# Patient Record
Sex: FEMALE | Race: OTHER | HISPANIC OR LATINO | Employment: FULL TIME | URBAN - METROPOLITAN AREA
[De-identification: names, ages, dates, MRNs, and addresses within clinical notes are randomized per-mention and may not be internally consistent; named-entity substitution may affect disease eponyms.]

---

## 2023-09-27 DIAGNOSIS — E78.5 HYPERLIPIDEMIA, UNSPECIFIED: ICD-10-CM

## 2023-09-27 DIAGNOSIS — E03.9 HYPOTHYROIDISM, UNSPECIFIED: ICD-10-CM

## 2023-09-27 RX ORDER — ROSUVASTATIN CALCIUM 5 MG/1
5 TABLET, COATED ORAL DAILY
Qty: 90 TABLET | Refills: 0 | Status: SHIPPED | OUTPATIENT
Start: 2023-09-27 | End: 2023-10-19 | Stop reason: ALTCHOICE

## 2023-09-27 RX ORDER — LEVOTHYROXINE SODIUM 25 UG/1
25 TABLET ORAL DAILY
Qty: 90 TABLET | Refills: 0 | Status: SHIPPED | OUTPATIENT
Start: 2023-09-27 | End: 2023-10-19 | Stop reason: ALTCHOICE

## 2023-10-19 ENCOUNTER — TELEMEDICINE (OUTPATIENT)
Dept: PRIMARY CARE | Facility: CLINIC | Age: 58
End: 2023-10-19
Payer: COMMERCIAL

## 2023-10-19 DIAGNOSIS — R05.1 ACUTE COUGH: Primary | ICD-10-CM

## 2023-10-19 DIAGNOSIS — R09.81 SINUS CONGESTION: ICD-10-CM

## 2023-10-19 PROCEDURE — 99214 OFFICE O/P EST MOD 30 MIN: CPT | Performed by: FAMILY MEDICINE

## 2023-10-19 RX ORDER — AMOXICILLIN AND CLAVULANATE POTASSIUM 875; 125 MG/1; MG/1
875 TABLET, FILM COATED ORAL 2 TIMES DAILY
Qty: 20 TABLET | Refills: 0 | Status: SHIPPED | OUTPATIENT
Start: 2023-10-19 | End: 2023-10-29

## 2023-10-19 RX ORDER — VALACYCLOVIR HYDROCHLORIDE 1 G/1
TABLET, FILM COATED ORAL
COMMUNITY
Start: 2015-10-27

## 2023-10-19 RX ORDER — NAPROXEN 500 MG/1
500 TABLET ORAL
COMMUNITY
Start: 2023-01-04 | End: 2024-03-06 | Stop reason: WASHOUT

## 2023-10-19 RX ORDER — PREDNISONE 10 MG/1
10 TABLET ORAL DAILY
Qty: 5 TABLET | Refills: 0 | Status: SHIPPED | OUTPATIENT
Start: 2023-10-19 | End: 2024-03-06 | Stop reason: WASHOUT

## 2023-10-19 ASSESSMENT — ENCOUNTER SYMPTOMS
STRIDOR: 0
DEPRESSION: 0
COUGH: 1
WHEEZING: 0
MYALGIAS: 1
SWEATS: 0
FEVER: 0
HEMOPTYSIS: 0
WEIGHT LOSS: 0
LOSS OF SENSATION IN FEET: 0
SORE THROAT: 1
TROUBLE SWALLOWING: 0
HEARTBURN: 0
ABDOMINAL PAIN: 1
DIARRHEA: 0
VOMITING: 0
RHINORRHEA: 1
OCCASIONAL FEELINGS OF UNSTEADINESS: 0

## 2023-10-19 ASSESSMENT — PATIENT HEALTH QUESTIONNAIRE - PHQ9
1. LITTLE INTEREST OR PLEASURE IN DOING THINGS: NOT AT ALL
2. FEELING DOWN, DEPRESSED OR HOPELESS: NOT AT ALL
10. IF YOU CHECKED OFF ANY PROBLEMS, HOW DIFFICULT HAVE THESE PROBLEMS MADE IT FOR YOU TO DO YOUR WORK, TAKE CARE OF THINGS AT HOME, OR GET ALONG WITH OTHER PEOPLE: SOMEWHAT DIFFICULT
SUM OF ALL RESPONSES TO PHQ9 QUESTIONS 1 AND 2: 0

## 2023-10-19 ASSESSMENT — COPD QUESTIONNAIRES: COPD: 0

## 2023-10-19 NOTE — PATIENT INSTRUCTIONS
Start antibiotics  Call if any worse or if no improvement in 3-5 days  Increase fluids  OTC decongestants as needed  Saline and OTC flonase as needed  Encouraged to take the covid test

## 2023-10-19 NOTE — PROGRESS NOTES
Subjective   Patient ID: Jigna Forrest is a 57 y.o. female who presents for Sore Throat (Losing voice/), Nausea, Sinusitis, and Cough (Productive  x 4 days).  Today she is accompanied by alone.     Sore Throat   This is a new problem. The current episode started in the past 7 days. The problem has been rapidly worsening. There has been no fever. The patient is experiencing no pain. Associated symptoms include abdominal pain and coughing. Pertinent negatives include no diarrhea, drooling, ear discharge, plugged ear sensation, stridor, trouble swallowing or vomiting. Associated symptoms comments: Nause, sore throat. She has tried cool liquids and NSAIDs for the symptoms. The treatment provided no relief.   Cough  This is a new problem. The problem has been gradually worsening. The cough is Productive of sputum. Associated symptoms include myalgias, nasal congestion, postnasal drip, rhinorrhea and a sore throat. Pertinent negatives include no chest pain, ear congestion, fever, heartburn, hemoptysis, rash, sweats, weight loss or wheezing. She has tried nothing for the symptoms. The treatment provided no relief. There is no history of asthma, bronchiectasis, bronchitis, COPD, emphysema, environmental allergies or pneumonia.       Review of Systems   Constitutional:  Negative for fever and weight loss.   HENT:  Positive for postnasal drip, rhinorrhea and sore throat. Negative for drooling, ear discharge and trouble swallowing.    Respiratory:  Positive for cough. Negative for hemoptysis, wheezing and stridor.    Cardiovascular:  Negative for chest pain.   Gastrointestinal:  Positive for abdominal pain. Negative for diarrhea, heartburn and vomiting.   Musculoskeletal:  Positive for myalgias.   Skin:  Negative for rash.   Allergic/Immunologic: Negative for environmental allergies.       Objective   There were no vitals taken for this visit.  BSA: There is no height or weight on file to calculate BSA.  Growth percentiles:  Facility age limit for growth %regis is 20 years. Facility age limit for growth %regis is 20 years.     Physical Exam  Nursing note reviewed.   Constitutional:       Appearance: Normal appearance.   Pulmonary:      Effort: Pulmonary effort is normal. No respiratory distress.   Neurological:      Mental Status: She is alert.   Psychiatric:         Mood and Affect: Mood normal.         Behavior: Behavior normal.         Thought Content: Thought content normal.         Judgment: Judgment normal.         Assessment/Plan   Problem List Items Addressed This Visit    None  Visit Diagnoses       Diagnosis Codes    Acute cough    -  Primary R05.1    Relevant Medications    predniSONE (Deltasone) 10 mg tablet    amoxicillin-pot clavulanate (Augmentin) 875-125 mg tablet    Sinus congestion     R09.81    Relevant Medications    predniSONE (Deltasone) 10 mg tablet    amoxicillin-pot clavulanate (Augmentin) 875-125 mg tablet        Start antibiotics  Call if any worse or if no improvement in 3-5 days  Increase fluids  OTC decongestants as needed  Saline and OTC flonase as needed

## 2023-10-26 DIAGNOSIS — E03.9 HYPOTHYROIDISM, UNSPECIFIED: ICD-10-CM

## 2023-10-27 RX ORDER — LEVOTHYROXINE SODIUM 25 UG/1
TABLET ORAL
Qty: 90 TABLET | Refills: 0 | Status: SHIPPED | OUTPATIENT
Start: 2023-10-27 | End: 2024-04-03 | Stop reason: SDUPTHER

## 2024-01-11 ENCOUNTER — TELEPHONE (OUTPATIENT)
Dept: PRIMARY CARE | Facility: CLINIC | Age: 59
End: 2024-01-11
Payer: COMMERCIAL

## 2024-01-11 DIAGNOSIS — I10 PRIMARY HYPERTENSION: ICD-10-CM

## 2024-01-11 RX ORDER — AMLODIPINE BESYLATE 5 MG/1
5 TABLET ORAL DAILY
Qty: 90 TABLET | Refills: 0 | Status: SHIPPED | OUTPATIENT
Start: 2024-01-11 | End: 2024-04-03 | Stop reason: SDUPTHER

## 2024-01-11 NOTE — TELEPHONE ENCOUNTER
Fax request from [facility]   Medication:  amLODIPine (Norvasc) 5 mg tablet   Once Daily  Quantity:90    Pharmacy: [facility]- [facility]   Pharmacy Number: [phone]    Pt number: [phone]

## 2024-02-15 ENCOUNTER — TELEPHONE (OUTPATIENT)
Dept: PRIMARY CARE | Facility: CLINIC | Age: 59
End: 2024-02-15
Payer: COMMERCIAL

## 2024-02-15 DIAGNOSIS — Z00.00 ROUTINE GENERAL MEDICAL EXAMINATION AT A HEALTH CARE FACILITY: ICD-10-CM

## 2024-02-15 NOTE — TELEPHONE ENCOUNTER
Pe 3/6 bw orders needed please include A1c.  She is concered about her weight and if she has diabetes or not to the lab downstairs

## 2024-02-28 ENCOUNTER — LAB (OUTPATIENT)
Dept: LAB | Facility: LAB | Age: 59
End: 2024-02-28
Payer: COMMERCIAL

## 2024-02-28 DIAGNOSIS — Z00.00 ROUTINE GENERAL MEDICAL EXAMINATION AT A HEALTH CARE FACILITY: ICD-10-CM

## 2024-02-28 PROCEDURE — 80061 LIPID PANEL: CPT

## 2024-02-28 PROCEDURE — 83036 HEMOGLOBIN GLYCOSYLATED A1C: CPT

## 2024-02-28 PROCEDURE — 80053 COMPREHEN METABOLIC PANEL: CPT

## 2024-02-28 PROCEDURE — 82306 VITAMIN D 25 HYDROXY: CPT

## 2024-02-28 PROCEDURE — 84443 ASSAY THYROID STIM HORMONE: CPT

## 2024-02-28 PROCEDURE — 85025 COMPLETE CBC W/AUTO DIFF WBC: CPT

## 2024-02-28 PROCEDURE — 36415 COLL VENOUS BLD VENIPUNCTURE: CPT

## 2024-02-29 LAB
25(OH)D3 SERPL-MCNC: 19 NG/ML (ref 30–100)
ALBUMIN SERPL BCP-MCNC: 4.5 G/DL (ref 3.4–5)
ALP SERPL-CCNC: 104 U/L (ref 33–110)
ALT SERPL W P-5'-P-CCNC: 25 U/L (ref 7–45)
ANION GAP SERPL CALC-SCNC: 12 MMOL/L (ref 10–20)
AST SERPL W P-5'-P-CCNC: 19 U/L (ref 9–39)
BASOPHILS # BLD AUTO: 0.04 X10*3/UL (ref 0–0.1)
BASOPHILS NFR BLD AUTO: 0.6 %
BILIRUB SERPL-MCNC: 0.4 MG/DL (ref 0–1.2)
BUN SERPL-MCNC: 18 MG/DL (ref 6–23)
CALCIUM SERPL-MCNC: 9.4 MG/DL (ref 8.6–10.6)
CHLORIDE SERPL-SCNC: 106 MMOL/L (ref 98–107)
CHOLEST SERPL-MCNC: 265 MG/DL (ref 0–199)
CHOLESTEROL/HDL RATIO: 4.8
CO2 SERPL-SCNC: 30 MMOL/L (ref 21–32)
CREAT SERPL-MCNC: 0.86 MG/DL (ref 0.5–1.05)
EGFRCR SERPLBLD CKD-EPI 2021: 79 ML/MIN/1.73M*2
EOSINOPHIL # BLD AUTO: 0.32 X10*3/UL (ref 0–0.7)
EOSINOPHIL NFR BLD AUTO: 4.6 %
ERYTHROCYTE [DISTWIDTH] IN BLOOD BY AUTOMATED COUNT: 14.3 % (ref 11.5–14.5)
EST. AVERAGE GLUCOSE BLD GHB EST-MCNC: 143 MG/DL
GLUCOSE SERPL-MCNC: 125 MG/DL (ref 74–99)
HBA1C MFR BLD: 6.6 %
HCT VFR BLD AUTO: 43.1 % (ref 36–46)
HDLC SERPL-MCNC: 55 MG/DL
HGB BLD-MCNC: 14.3 G/DL (ref 12–16)
IMM GRANULOCYTES # BLD AUTO: 0.04 X10*3/UL (ref 0–0.7)
IMM GRANULOCYTES NFR BLD AUTO: 0.6 % (ref 0–0.9)
LDLC SERPL CALC-MCNC: 185 MG/DL
LYMPHOCYTES # BLD AUTO: 3.03 X10*3/UL (ref 1.2–4.8)
LYMPHOCYTES NFR BLD AUTO: 43.5 %
MCH RBC QN AUTO: 28.9 PG (ref 26–34)
MCHC RBC AUTO-ENTMCNC: 33.2 G/DL (ref 32–36)
MCV RBC AUTO: 87 FL (ref 80–100)
MONOCYTES # BLD AUTO: 0.45 X10*3/UL (ref 0.1–1)
MONOCYTES NFR BLD AUTO: 6.5 %
NEUTROPHILS # BLD AUTO: 3.08 X10*3/UL (ref 1.2–7.7)
NEUTROPHILS NFR BLD AUTO: 44.2 %
NON HDL CHOLESTEROL: 210 MG/DL (ref 0–149)
NRBC BLD-RTO: 0 /100 WBCS (ref 0–0)
PLATELET # BLD AUTO: 329 X10*3/UL (ref 150–450)
POTASSIUM SERPL-SCNC: 4.6 MMOL/L (ref 3.5–5.3)
PROT SERPL-MCNC: 7.2 G/DL (ref 6.4–8.2)
RBC # BLD AUTO: 4.94 X10*6/UL (ref 4–5.2)
SODIUM SERPL-SCNC: 143 MMOL/L (ref 136–145)
TRIGL SERPL-MCNC: 127 MG/DL (ref 0–149)
TSH SERPL-ACNC: 3.85 MIU/L (ref 0.44–3.98)
VLDL: 25 MG/DL (ref 0–40)
WBC # BLD AUTO: 7 X10*3/UL (ref 4.4–11.3)

## 2024-03-05 ASSESSMENT — ENCOUNTER SYMPTOMS
LIGHT-HEADEDNESS: 0
COLOR CHANGE: 0
PALPITATIONS: 0
BACK PAIN: 0
APPETITE CHANGE: 0
FEVER: 0
CHEST TIGHTNESS: 0
ACTIVITY CHANGE: 0
HEADACHES: 0
ARTHRALGIAS: 0
FACIAL ASYMMETRY: 0
COUGH: 0
FATIGUE: 0
CHOKING: 0
DIZZINESS: 0

## 2024-03-05 NOTE — PROGRESS NOTES
"Subjective   Patient ID: Jigna Forrest is a 58 y.o. female who presents for Annual Exam.    HPI   Patient presents for physical exam.     Fam Hx  MGM CVA  Mom () living, MI (60's), HTN,   Dad (77) , CAD, MI, HTN  Brother () living, HTN  Brother () living, MI (40)  Sister () living,      Exercise walks  ETOH 4 glasses wine/week  Caffeine 2 cups coffee/day  Tobacco denies     Mammogram due   DEXA @ 67  Colonoscopy due      Past Med Hx  denies     Past Surg Hx  wisdom teeth    left breast implant, silicone   tummy tuck      Patient denies other complaints.   Review of Systems   Constitutional:  Negative for activity change, appetite change, fatigue and fever.   HENT:  Negative for congestion.    Respiratory:  Negative for cough, choking and chest tightness.    Cardiovascular:  Negative for chest pain, palpitations and leg swelling.   Musculoskeletal:  Negative for arthralgias, back pain and gait problem.   Skin:  Negative for color change and pallor.   Neurological:  Negative for dizziness, facial asymmetry, light-headedness and headaches.       Objective   /76 (BP Location: Right arm)   Pulse 96   Ht 1.562 m (5' 1.5\")   Wt 95 kg (209 lb 6.4 oz)   BMI 38.93 kg/m²   BSA Body surface area is 2.03 meters squared.      Physical Exam  Constitutional:       General: She is not in acute distress.     Appearance: Normal appearance. She is not toxic-appearing.   HENT:      Head: Normocephalic.      Right Ear: Tympanic membrane, ear canal and external ear normal.      Left Ear: Tympanic membrane, ear canal and external ear normal.   Eyes:      Conjunctiva/sclera: Conjunctivae normal.      Pupils: Pupils are equal, round, and reactive to light.   Cardiovascular:      Rate and Rhythm: Normal rate and regular rhythm.      Pulses: Normal pulses.      Heart sounds: Normal heart sounds.   Pulmonary:      Effort: No respiratory distress.      Breath sounds: No wheezing, rhonchi or rales. "   Abdominal:      General: Bowel sounds are normal. There is no distension.      Palpations: Abdomen is soft.      Tenderness: There is no abdominal tenderness.   Musculoskeletal:         General: No swelling or tenderness.   Skin:     Findings: No lesion or rash.   Neurological:      General: No focal deficit present.      Mental Status: She is alert and oriented to person, place, and time. Mental status is at baseline.      Gait: Gait normal.   Psychiatric:         Mood and Affect: Mood normal.         Behavior: Behavior normal.         Thought Content: Thought content normal.         Judgment: Judgment normal.       Lab on 02/28/2024   Component Date Value Ref Range Status    Cholesterol 02/28/2024 265 (H)  0 - 199 mg/dL Final          Age      Desirable   Borderline High   High     0-19 Y     0 - 169       170 - 199     >/= 200    20-24 Y     0 - 189       190 - 224     >/= 225         >24 Y     0 - 199       200 - 239     >/= 240   **All ranges are based on fasting samples. Specific   therapeutic targets will vary based on patient-specific   cardiac risk.    Pediatric guidelines reference:Pediatrics 2011, 128(S5).Adult guidelines reference: NCEP ATPIII Guidelines,PAYAL 2001, 258:2486-97    Venipuncture immediately after or during the administration of Metamizole may lead to falsely low results. Testing should be performed immediately prior to Metamizole dosing.    HDL-Cholesterol 02/28/2024 55.0  mg/dL Final      Age       Very Low   Low     Normal    High    0-19 Y    < 35      < 40     40-45     ----  20-24 Y    ----     < 40      >45      ----        >24 Y      ----     < 40     40-60      >60      Cholesterol/HDL Ratio 02/28/2024 4.8   Final      Ref Values  Desirable  < 3.4  High Risk  > 5.0    LDL Calculated 02/28/2024 185 (H)  <=99 mg/dL Final                                Near   Borderline      AGE      Desirable  Optimal    High     High     Very High     0-19 Y     0 - 109     ---    110-129   >/= 130      ----    20-24 Y     0 - 119     ---    120-159   >/= 160     ----      >24 Y     0 -  99   100-129  130-159   160-189     >/=190      VLDL 02/28/2024 25  0 - 40 mg/dL Final    Triglycerides 02/28/2024 127  0 - 149 mg/dL Final       Age         Desirable   Borderline High   High     Very High   0 D-90 D    19 - 174         ----         ----        ----  91 D- 9 Y     0 -  74        75 -  99     >/= 100      ----    10-19 Y     0 -  89        90 - 129     >/= 130      ----    20-24 Y     0 - 114       115 - 149     >/= 150      ----         >24 Y     0 - 149       150 - 199    200- 499    >/= 500    Venipuncture immediately after or during the administration of Metamizole may lead to falsely low results. Testing should be performed immediately prior to Metamizole dosing.    Non HDL Cholesterol 02/28/2024 210 (H)  0 - 149 mg/dL Final          Age       Desirable   Borderline High   High     Very High     0-19 Y     0 - 119       120 - 144     >/= 145    >/= 160    20-24 Y     0 - 149       150 - 189     >/= 190      ----         >24 Y    30 mg/dL above LDL Cholesterol goal      Glucose 02/28/2024 125 (H)  74 - 99 mg/dL Final    Sodium 02/28/2024 143  136 - 145 mmol/L Final    Potassium 02/28/2024 4.6  3.5 - 5.3 mmol/L Final    Chloride 02/28/2024 106  98 - 107 mmol/L Final    Bicarbonate 02/28/2024 30  21 - 32 mmol/L Final    Anion Gap 02/28/2024 12  10 - 20 mmol/L Final    Urea Nitrogen 02/28/2024 18  6 - 23 mg/dL Final    Creatinine 02/28/2024 0.86  0.50 - 1.05 mg/dL Final    eGFR 02/28/2024 79  >60 mL/min/1.73m*2 Final    Calculations of estimated GFR are performed using the 2021 CKD-EPI Study Refit equation without the race variable for the IDMS-Traceable creatinine methods.  https://jasn.asnjournals.org/content/early/2021/09/22/ASN.3982060943    Calcium 02/28/2024 9.4  8.6 - 10.6 mg/dL Final    Albumin 02/28/2024 4.5  3.4 - 5.0 g/dL Final    Alkaline Phosphatase 02/28/2024 104  33 - 110 U/L Final    Total  Protein 02/28/2024 7.2  6.4 - 8.2 g/dL Final    AST 02/28/2024 19  9 - 39 U/L Final    Bilirubin, Total 02/28/2024 0.4  0.0 - 1.2 mg/dL Final    ALT 02/28/2024 25  7 - 45 U/L Final    Patients treated with Sulfasalazine may generate falsely decreased results for ALT.    Thyroid Stimulating Hormone 02/28/2024 3.85  0.44 - 3.98 mIU/L Final    Vitamin D, 25-Hydroxy, Total 02/28/2024 19 (L)  30 - 100 ng/mL Final    Hemoglobin A1C 02/28/2024 6.6 (H)  see below % Final    Estimated Average Glucose 02/28/2024 143  Not Established mg/dL Final    WBC 02/28/2024 7.0  4.4 - 11.3 x10*3/uL Final    nRBC 02/28/2024 0.0  0.0 - 0.0 /100 WBCs Final    RBC 02/28/2024 4.94  4.00 - 5.20 x10*6/uL Final    Hemoglobin 02/28/2024 14.3  12.0 - 16.0 g/dL Final    Hematocrit 02/28/2024 43.1  36.0 - 46.0 % Final    MCV 02/28/2024 87  80 - 100 fL Final    MCH 02/28/2024 28.9  26.0 - 34.0 pg Final    MCHC 02/28/2024 33.2  32.0 - 36.0 g/dL Final    RDW 02/28/2024 14.3  11.5 - 14.5 % Final    Platelets 02/28/2024 329  150 - 450 x10*3/uL Final    Neutrophils % 02/28/2024 44.2  40.0 - 80.0 % Final    Immature Granulocytes %, Automated 02/28/2024 0.6  0.0 - 0.9 % Final    Immature Granulocyte Count (IG) includes promyelocytes, myelocytes and metamyelocytes but does not include bands. Percent differential counts (%) should be interpreted in the context of the absolute cell counts (cells/UL).    Lymphocytes % 02/28/2024 43.5  13.0 - 44.0 % Final    Monocytes % 02/28/2024 6.5  2.0 - 10.0 % Final    Eosinophils % 02/28/2024 4.6  0.0 - 6.0 % Final    Basophils % 02/28/2024 0.6  0.0 - 2.0 % Final    Neutrophils Absolute 02/28/2024 3.08  1.20 - 7.70 x10*3/uL Final    Percent differential counts (%) should be interpreted in the context of the absolute cell counts (cells/uL).    Immature Granulocytes Absolute, Au* 02/28/2024 0.04  0.00 - 0.70 x10*3/uL Final    Lymphocytes Absolute 02/28/2024 3.03  1.20 - 4.80 x10*3/uL Final    Monocytes Absolute 02/28/2024  0.45  0.10 - 1.00 x10*3/uL Final    Eosinophils Absolute 02/28/2024 0.32  0.00 - 0.70 x10*3/uL Final    Basophils Absolute 02/28/2024 0.04  0.00 - 0.10 x10*3/uL Final     Current Outpatient Medications on File Prior to Visit   Medication Sig Dispense Refill    amLODIPine (Norvasc) 5 mg tablet Take 1 tablet (5 mg) by mouth once daily. 90 tablet 0    levothyroxine (Synthroid, Levoxyl) 25 mcg tablet TAKE 1 TABLET BY MOUTH ONCE DAILY 90 tablet 0    valACYclovir (Valtrex) 1 gram tablet Take by mouth.      [DISCONTINUED] naproxen (Naprosyn) 500 mg tablet Take 1 tablet (500 mg) by mouth 2 times a day with meals.      [DISCONTINUED] predniSONE (Deltasone) 10 mg tablet Take 1 tablet (10 mg) by mouth once daily. (Patient not taking: Reported on 3/6/2024) 5 tablet 0     No current facility-administered medications on file prior to visit.     No images are attached to the encounter.            Assessment/Plan   Diagnoses and all orders for this visit:  Healthcare maintenance  Type 2 diabetes mellitus without complication, without long-term current use of insulin (CMS/[facility])  Familial hypercholesterolemia  Primary hypertension  1. Patient's blood work discussed at this office visit  2. Patient's LDL goal less than 70 secondary to diabetes, current , continue on type II diet now that patient is diabetic we need to have her start on statin therapy  3. Patient's blood glucose elevated continue on no added sugar diet, now diabetic  4. Patient's thyroid level is still not adequately replaced we will discuss changes dose of medication and recheck TSH with reflex T4 in 8 weeks  5. Patient's vitamin D level is low start her on vitamin D3 2000 units daily for lifetime  6.  Patient's hemoglobin A1c is 6.6 this is now a diabetic we will have discussion at this office visit, needs yearly eye exam and dentist visit every 6 months, would recommend starting metformin vs ozempic giving needles and lancets to check her blood sugar first  thing in the morning to get a trend, may need to start metformin  7. Mammogram due 2024  8. DEXA @ 67  9. Colonoscopy due 2024  10. Patient to call questions or concerns

## 2024-03-06 ENCOUNTER — OFFICE VISIT (OUTPATIENT)
Dept: PRIMARY CARE | Facility: CLINIC | Age: 59
End: 2024-03-06
Payer: COMMERCIAL

## 2024-03-06 VITALS
HEART RATE: 96 BPM | WEIGHT: 209.4 LBS | BODY MASS INDEX: 38.53 KG/M2 | SYSTOLIC BLOOD PRESSURE: 138 MMHG | HEIGHT: 62 IN | DIASTOLIC BLOOD PRESSURE: 76 MMHG

## 2024-03-06 DIAGNOSIS — Z00.00 HEALTHCARE MAINTENANCE: Primary | ICD-10-CM

## 2024-03-06 DIAGNOSIS — E11.9 TYPE 2 DIABETES MELLITUS WITHOUT COMPLICATION, WITHOUT LONG-TERM CURRENT USE OF INSULIN (MULTI): ICD-10-CM

## 2024-03-06 DIAGNOSIS — Z12.31 ENCOUNTER FOR SCREENING MAMMOGRAM FOR MALIGNANT NEOPLASM OF BREAST: ICD-10-CM

## 2024-03-06 DIAGNOSIS — R79.89 ABNORMAL TSH: ICD-10-CM

## 2024-03-06 DIAGNOSIS — Z12.11 ENCOUNTER FOR SCREENING COLONOSCOPY: ICD-10-CM

## 2024-03-06 DIAGNOSIS — I10 PRIMARY HYPERTENSION: ICD-10-CM

## 2024-03-06 DIAGNOSIS — E78.01 FAMILIAL HYPERCHOLESTEROLEMIA: ICD-10-CM

## 2024-03-06 PROCEDURE — 3044F HG A1C LEVEL LT 7.0%: CPT | Performed by: FAMILY MEDICINE

## 2024-03-06 PROCEDURE — 99396 PREV VISIT EST AGE 40-64: CPT | Performed by: FAMILY MEDICINE

## 2024-03-06 PROCEDURE — 3078F DIAST BP <80 MM HG: CPT | Performed by: FAMILY MEDICINE

## 2024-03-06 PROCEDURE — 3050F LDL-C >= 130 MG/DL: CPT | Performed by: FAMILY MEDICINE

## 2024-03-06 PROCEDURE — 1036F TOBACCO NON-USER: CPT | Performed by: FAMILY MEDICINE

## 2024-03-06 PROCEDURE — 3075F SYST BP GE 130 - 139MM HG: CPT | Performed by: FAMILY MEDICINE

## 2024-03-06 RX ORDER — METFORMIN HYDROCHLORIDE 500 MG/1
500 TABLET, EXTENDED RELEASE ORAL
Qty: 100 TABLET | Refills: 3 | Status: CANCELLED | OUTPATIENT
Start: 2024-03-06 | End: 2025-04-10

## 2024-03-06 RX ORDER — ROSUVASTATIN CALCIUM 5 MG/1
5 TABLET, COATED ORAL DAILY
Qty: 100 TABLET | Refills: 3 | Status: CANCELLED | OUTPATIENT
Start: 2024-03-06 | End: 2025-04-10

## 2024-03-06 RX ORDER — BLOOD SUGAR DIAGNOSTIC
STRIP MISCELLANEOUS
Qty: 100 STRIP | Refills: 3 | Status: SHIPPED | OUTPATIENT
Start: 2024-03-06

## 2024-03-06 RX ORDER — LANCETS
EACH MISCELLANEOUS
Qty: 100 EACH | Refills: 3 | Status: SHIPPED | OUTPATIENT
Start: 2024-03-06

## 2024-03-06 RX ORDER — INSULIN PUMP SYRINGE, 3 ML
EACH MISCELLANEOUS
Qty: 1 EACH | Refills: 0 | Status: SHIPPED | OUTPATIENT
Start: 2024-03-06

## 2024-03-06 ASSESSMENT — PATIENT HEALTH QUESTIONNAIRE - PHQ9
2. FEELING DOWN, DEPRESSED OR HOPELESS: NOT AT ALL
1. LITTLE INTEREST OR PLEASURE IN DOING THINGS: NOT AT ALL
SUM OF ALL RESPONSES TO PHQ9 QUESTIONS 1 AND 2: 0
10. IF YOU CHECKED OFF ANY PROBLEMS, HOW DIFFICULT HAVE THESE PROBLEMS MADE IT FOR YOU TO DO YOUR WORK, TAKE CARE OF THINGS AT HOME, OR GET ALONG WITH OTHER PEOPLE: NOT DIFFICULT AT ALL

## 2024-03-15 ENCOUNTER — TELEPHONE (OUTPATIENT)
Dept: PRIMARY CARE | Facility: CLINIC | Age: 59
End: 2024-03-15
Payer: COMMERCIAL

## 2024-03-15 NOTE — TELEPHONE ENCOUNTER
Pt called in wanting to ask some questions. She is concerned about being put on Ozempic. Pt states she has a gorder on her thyroid and wants to know if that is something to be concerned about? Pt states she also suffers from hypothyroidism and would like to know if it's safe for her to take Ozempic? Pt also would like to know the size of the gorder?     Pt number [phone]

## 2024-03-22 ENCOUNTER — TELEMEDICINE (OUTPATIENT)
Dept: PHARMACY | Facility: HOSPITAL | Age: 59
End: 2024-03-22
Payer: COMMERCIAL

## 2024-03-22 DIAGNOSIS — E11.9 TYPE 2 DIABETES MELLITUS WITHOUT COMPLICATION, WITHOUT LONG-TERM CURRENT USE OF INSULIN (MULTI): ICD-10-CM

## 2024-03-22 DIAGNOSIS — E78.01 FAMILIAL HYPERCHOLESTEROLEMIA: ICD-10-CM

## 2024-03-22 NOTE — PROGRESS NOTES
Ph[facility]: Initial Visit    Subjective   Patient ID: Jigna Forrest is a 58 y.o. female who presents for Diabetes.    Referring Provider: Kristal Luis DO     Patient presents to Clinical Pharmacy at request of PCP for medication management in regards to diabetes.     Had long discussion with patient in regards to starting Metformin vs. Ozempic. Patient is more inclined towards Ozempic, however is concerned about possible contraindications due to her history of having a goiter and uterine fibroids. Discussed that there is no link between GLP1-RA and fibroids, but as long as she does not have a personal or family history of thyroid cancer Ozempic will be safe to take.     Patient is also concerned about losing too much weight too quickly and loss of muscle mass. Discussed that while response to Ozempic varies from patient to patient, usually more weight loss is seen at higher doses as compared to lower doses. Discussed that we could do a slower dose titration (i.e, increase dose every 8-12 weeks as opposed to every 4) based on her initial response.     Counseled that most patients who are losing muscle mass while on GLP1-Kavitha are usually not eating enough protein due to loss of appetite. Discussed that if started on Ozempic, making sure she eats enough protein during her meals, even if she does not feel as hungry, would help prevent this. Discussed good sources of protein (meat, seafood, dairy, protein shakes).     Objective     There were no vitals taken for this visit.     Labs  Lab Results   Component Value Date    BILITOT 0.4 02/28/2024    CALCIUM 9.4 02/28/2024    CO2 30 02/28/2024     02/28/2024    CREATININE 0.86 02/28/2024    GLUCOSE 125 (H) 02/28/2024    ALKPHOS 104 02/28/2024    K 4.6 02/28/2024    PROT 7.2 02/28/2024     02/28/2024    AST 19 02/28/2024    ALT 25 02/28/2024    BUN 18 02/28/2024    ANIONGAP 12 02/28/2024    ALBUMIN 4.5 02/28/2024    AMYLASE 44 09/30/2020     LIPASE 46 09/30/2020    GFRF 82 11/22/2022     Lab Results   Component Value Date    TRIG 127 02/28/2024    CHOL 265 (H) 02/28/2024    LDLCALC 185 (H) 02/28/2024    HDL 55.0 02/28/2024     Lab Results   Component Value Date    HGBA1C 6.6 (H) 02/28/2024       Current Outpatient Medications on File Prior to Visit   Medication Sig Dispense Refill    amLODIPine (Norvasc) 5 mg tablet Take 1 tablet (5 mg) by mouth once daily. 90 tablet 0    Blood glucose monitoring meter kit (FreeStyle Lite Meter) kit Test once daily fasting in the am. 1 each 0    blood sugar diagnostic (Freestyle InsuLinx) strip Test once daily fasting in the am. 100 strip 3    lancets misc Test once daily fasting in the am. 100 each 3    levothyroxine (Synthroid, Levoxyl) 25 mcg tablet TAKE 1 TABLET BY MOUTH ONCE DAILY 90 tablet 0    valACYclovir (Valtrex) 1 gram tablet Take by mouth.       No current facility-administered medications on file prior to visit.        Assessment/Plan     ASSESSMENT:  -Patient's most recent A1c elevated from last at 6.6%; now indicated for medication to treat diabetes.  -Discussed starting Metformin vs. Ozempic; while some weight loss is seen with patients taking Metformin, patient will see better weight loss with Ozempic. Patient is inclined towards Ozempic, but is concerned about various side effects and contraindications.   -Patient has history of thyroid goiter and uterine fibroids. Discussed that there is no known link between GLP1-RA and fibroids, but as long as she does not have a personal or family history of thyroid cancer Ozempic will be safe to take despite goiter.  -Concerned about losing weight too quickly. Discussed that while response to Ozempic varies from patient to patient, usually any GI side effects subside after ~2 doses. Usually more weight loss is seen at higher doses as compared to lower doses. Discussed that we could do a slower dose titration (i.e, increase dose every 8-12 weeks as opposed to every  4) based on her initial response.   -Discussed that if started on Ozempic, making sure she eats enough protein during her meals, even if she does not feel as hungry, would help prevent decreased muscle mass. Discussed good sources of protein (meat, seafood, dairy, protein shakes).     RECOMMENDATIONS/PLAN:    Patient notes that she will discuss more with her  before making a decision. Would recommend start of Ozempic 0.25 mg once weekly if patient is agreeable.   Did not have time to discuss Repatha as alternative to statins due to most of conversation revolving around Ozempic. Will discuss more at next follow-up.   Will follow-up with patient in 1 month and start therapy if able.    Radha Salamanca PharmD  Clinical Ambulatory Care Pharmacist  Ph: [phone]    Continue all meds under the continuation of care with the referring provider and clinical pharmacy team.    Clinical Pharmacist follow up: 4/26/24 or sooner as needed based on clinical intervention  Type of Encounter:  Telephone    Verbal consent to manage patient's drug therapy was obtained from the patient. They were informed they may decline to participate or withdraw from participation in pharmacy services at any time.

## 2024-04-01 ENCOUNTER — TELEPHONE (OUTPATIENT)
Dept: PRIMARY CARE | Facility: CLINIC | Age: 59
End: 2024-04-01
Payer: COMMERCIAL

## 2024-04-01 NOTE — TELEPHONE ENCOUNTER
Patient states that her blood pressure has been elevated since 3/29 Friday 171/111 and as of 4/1 today blood pressure is 166/115. Patient states that she has taken her husbands hydrochlorothiazide pill with her Amlodipine pill on 3/31, and stated that the blood pressure bottom number diastolic  has been decreased to 102, but as of today blood pressure is 166/115 without the water pill. Patient is asking if she could get a water pill until her appointment which is 4/9/24.

## 2024-04-01 NOTE — TELEPHONE ENCOUNTER
Called patient to give advice from Dr. Luis to go to the ER.  Patient states that her blood pressure was higher two years ago and that she was sent home from the hospital with instructions to take two tablets of amlodipine which equals to 10 mg if blood pressure is over 180 systolic. Patient was asking if she should do that regime or continue to take her husbands hydrochlorothiazide, with the 10 mg of Amlodipine, because she does not want to go to the ER to sent home, patient states at this moment she feels fine, no dizziness, no headache at this moment.  Patient also want to see if she can get a doctors appointment sooner than 4/9. The  is addiment on patient, doing one of these regimen, and if one of the regime does not work that they would go to the hospital to be checked out.  I told them both that I would follow up as so I get an answer.

## 2024-04-03 DIAGNOSIS — I10 PRIMARY HYPERTENSION: ICD-10-CM

## 2024-04-03 DIAGNOSIS — E03.9 HYPOTHYROIDISM, UNSPECIFIED: ICD-10-CM

## 2024-04-03 RX ORDER — LEVOTHYROXINE SODIUM 25 UG/1
25 TABLET ORAL DAILY
Qty: 90 TABLET | Refills: 0 | Status: SHIPPED | OUTPATIENT
Start: 2024-04-03

## 2024-04-03 RX ORDER — AMLODIPINE BESYLATE 5 MG/1
5 TABLET ORAL DAILY
Qty: 90 TABLET | Refills: 0 | Status: SHIPPED | OUTPATIENT
Start: 2024-04-03 | End: 2024-05-21 | Stop reason: SDUPTHER

## 2024-04-09 ENCOUNTER — OFFICE VISIT (OUTPATIENT)
Dept: PRIMARY CARE | Facility: CLINIC | Age: 59
End: 2024-04-09
Payer: COMMERCIAL

## 2024-04-09 VITALS
SYSTOLIC BLOOD PRESSURE: 128 MMHG | DIASTOLIC BLOOD PRESSURE: 86 MMHG | HEART RATE: 78 BPM | BODY MASS INDEX: 37.1 KG/M2 | WEIGHT: 199.6 LBS

## 2024-04-09 DIAGNOSIS — I10 PRIMARY HYPERTENSION: Primary | ICD-10-CM

## 2024-04-09 DIAGNOSIS — E78.01 FAMILIAL HYPERCHOLESTEROLEMIA: ICD-10-CM

## 2024-04-09 DIAGNOSIS — E11.9 TYPE 2 DIABETES MELLITUS WITHOUT COMPLICATION, WITHOUT LONG-TERM CURRENT USE OF INSULIN (MULTI): ICD-10-CM

## 2024-04-09 PROCEDURE — 1036F TOBACCO NON-USER: CPT | Performed by: FAMILY MEDICINE

## 2024-04-09 PROCEDURE — 3074F SYST BP LT 130 MM HG: CPT | Performed by: FAMILY MEDICINE

## 2024-04-09 PROCEDURE — 3050F LDL-C >= 130 MG/DL: CPT | Performed by: FAMILY MEDICINE

## 2024-04-09 PROCEDURE — 99214 OFFICE O/P EST MOD 30 MIN: CPT | Performed by: FAMILY MEDICINE

## 2024-04-09 PROCEDURE — 3079F DIAST BP 80-89 MM HG: CPT | Performed by: FAMILY MEDICINE

## 2024-04-09 PROCEDURE — 3044F HG A1C LEVEL LT 7.0%: CPT | Performed by: FAMILY MEDICINE

## 2024-04-09 RX ORDER — HYDROCHLOROTHIAZIDE 25 MG/1
25 TABLET ORAL DAILY
Qty: 30 TABLET | Refills: 11 | Status: SHIPPED | OUTPATIENT
Start: 2024-04-09 | End: 2025-04-09

## 2024-04-09 ASSESSMENT — ENCOUNTER SYMPTOMS
DIZZINESS: 0
CHEST TIGHTNESS: 0
COUGH: 0
LIGHT-HEADEDNESS: 0
HEADACHES: 0
FACIAL ASYMMETRY: 0
CHOKING: 0
ARTHRALGIAS: 0
APPETITE CHANGE: 0
BACK PAIN: 0
PALPITATIONS: 0
FEVER: 0
FATIGUE: 0
COLOR CHANGE: 0
ACTIVITY CHANGE: 0

## 2024-04-09 ASSESSMENT — PATIENT HEALTH QUESTIONNAIRE - PHQ9
1. LITTLE INTEREST OR PLEASURE IN DOING THINGS: NOT AT ALL
2. FEELING DOWN, DEPRESSED OR HOPELESS: NOT AT ALL
10. IF YOU CHECKED OFF ANY PROBLEMS, HOW DIFFICULT HAVE THESE PROBLEMS MADE IT FOR YOU TO DO YOUR WORK, TAKE CARE OF THINGS AT HOME, OR GET ALONG WITH OTHER PEOPLE: NOT DIFFICULT AT ALL
SUM OF ALL RESPONSES TO PHQ9 QUESTIONS 1 AND 2: 0

## 2024-04-09 NOTE — PROGRESS NOTES
Subjective   Patient ID: Jigna Forrest is a 58 y.o. female who presents for Elevated Blood pressure readings. .    HPI   She was noting that her bp was elevated after getting some dizzy episodes.  She did go to the ER and was told to follow up with her pcp.    Review of Systems   Constitutional:  Negative for activity change, appetite change, fatigue and fever.   HENT:  Negative for congestion.    Respiratory:  Negative for cough, choking and chest tightness.    Cardiovascular:  Negative for chest pain, palpitations and leg swelling.   Musculoskeletal:  Negative for arthralgias, back pain and gait problem.   Skin:  Negative for color change and pallor.   Neurological:  Negative for dizziness, facial asymmetry, light-headedness and headaches.       Objective   /86 (BP Location: Left arm)   Pulse 78   Wt 90.5 kg (199 lb 9.6 oz)   BMI 37.10 kg/m²   BSA Body surface area is 1.98 meters squared.      Physical Exam  Constitutional:       General: She is not in acute distress.     Appearance: Normal appearance. She is not toxic-appearing.   HENT:      Head: Normocephalic.      Right Ear: Tympanic membrane, ear canal and external ear normal.      Left Ear: Tympanic membrane, ear canal and external ear normal.   Eyes:      Conjunctiva/sclera: Conjunctivae normal.      Pupils: Pupils are equal, round, and reactive to light.   Cardiovascular:      Rate and Rhythm: Normal rate and regular rhythm.      Pulses: Normal pulses.      Heart sounds: Normal heart sounds.   Pulmonary:      Effort: No respiratory distress.      Breath sounds: No wheezing, rhonchi or rales.   Musculoskeletal:         General: No swelling or tenderness.   Skin:     Findings: No lesion or rash.   Neurological:      General: No focal deficit present.      Mental Status: She is alert and oriented to person, place, and time. Mental status is at baseline.      Gait: Gait normal.   Psychiatric:         Mood and Affect: Mood normal.         Behavior:  Behavior normal.         Thought Content: Thought content normal.         Judgment: Judgment normal.       Lab on 02/28/2024   Component Date Value Ref Range Status    Cholesterol 02/28/2024 265 (H)  0 - 199 mg/dL Final          Age      Desirable   Borderline High   High     0-19 Y     0 - 169       170 - 199     >/= 200    20-24 Y     0 - 189       190 - 224     >/= 225         >24 Y     0 - 199       200 - 239     >/= 240   **All ranges are based on fasting samples. Specific   therapeutic targets will vary based on patient-specific   cardiac risk.    Pediatric guidelines reference:Pediatrics 2011, 128(S5).Adult guidelines reference: NCEP ATPIII Guidelines,PAYAL 2001, 258:2486-97    Venipuncture immediately after or during the administration of Metamizole may lead to falsely low results. Testing should be performed immediately prior to Metamizole dosing.    HDL-Cholesterol 02/28/2024 55.0  mg/dL Final      Age       Very Low   Low     Normal    High    0-19 Y    < 35      < 40     40-45     ----  20-24 Y    ----     < 40      >45      ----        >24 Y      ----     < 40     40-60      >60      Cholesterol/HDL Ratio 02/28/2024 4.8   Final      Ref Values  Desirable  < 3.4  High Risk  > 5.0    LDL Calculated 02/28/2024 185 (H)  <=99 mg/dL Final                                Near   Borderline      AGE      Desirable  Optimal    High     High     Very High     0-19 Y     0 - 109     ---    110-129   >/= 130     ----    20-24 Y     0 - 119     ---    120-159   >/= 160     ----      >24 Y     0 -  99   100-129  130-159   160-189     >/=190      VLDL 02/28/2024 25  0 - 40 mg/dL Final    Triglycerides 02/28/2024 127  0 - 149 mg/dL Final       Age         Desirable   Borderline High   High     Very High   0 D-90 D    19 - 174         ----         ----        ----  91 D- 9 Y     0 -  74        75 -  99     >/= 100      ----    10-19 Y     0 -  89        90 - 129     >/= 130      ----    20-24 Y     0 - 114       115 - 149      >/= 150      ----         >24 Y     0 - 149       150 - 199    200- 499    >/= 500    Venipuncture immediately after or during the administration of Metamizole may lead to falsely low results. Testing should be performed immediately prior to Metamizole dosing.    Non HDL Cholesterol 02/28/2024 210 (H)  0 - 149 mg/dL Final          Age       Desirable   Borderline High   High     Very High     0-19 Y     0 - 119       120 - 144     >/= 145    >/= 160    20-24 Y     0 - 149       150 - 189     >/= 190      ----         >24 Y    30 mg/dL above LDL Cholesterol goal      Glucose 02/28/2024 125 (H)  74 - 99 mg/dL Final    Sodium 02/28/2024 143  136 - 145 mmol/L Final    Potassium 02/28/2024 4.6  3.5 - 5.3 mmol/L Final    Chloride 02/28/2024 106  98 - 107 mmol/L Final    Bicarbonate 02/28/2024 30  21 - 32 mmol/L Final    Anion Gap 02/28/2024 12  10 - 20 mmol/L Final    Urea Nitrogen 02/28/2024 18  6 - 23 mg/dL Final    Creatinine 02/28/2024 0.86  0.50 - 1.05 mg/dL Final    eGFR 02/28/2024 79  >60 mL/min/1.73m*2 Final    Calculations of estimated GFR are performed using the 2021 CKD-EPI Study Refit equation without the race variable for the IDMS-Traceable creatinine methods.  https://jasn.asnjournals.org/content/early/2021/09/22/ASN.7637993081    Calcium 02/28/2024 9.4  8.6 - 10.6 mg/dL Final    Albumin 02/28/2024 4.5  3.4 - 5.0 g/dL Final    Alkaline Phosphatase 02/28/2024 104  33 - 110 U/L Final    Total Protein 02/28/2024 7.2  6.4 - 8.2 g/dL Final    AST 02/28/2024 19  9 - 39 U/L Final    Bilirubin, Total 02/28/2024 0.4  0.0 - 1.2 mg/dL Final    ALT 02/28/2024 25  7 - 45 U/L Final    Patients treated with Sulfasalazine may generate falsely decreased results for ALT.    Thyroid Stimulating Hormone 02/28/2024 3.85  0.44 - 3.98 mIU/L Final    Vitamin D, 25-Hydroxy, Total 02/28/2024 19 (L)  30 - 100 ng/mL Final    Hemoglobin A1C 02/28/2024 6.6 (H)  see below % Final    Estimated Average Glucose 02/28/2024 143  Not  Established mg/dL Final    WBC 02/28/2024 7.0  4.4 - 11.3 x10*3/uL Final    nRBC 02/28/2024 0.0  0.0 - 0.0 /100 WBCs Final    RBC 02/28/2024 4.94  4.00 - 5.20 x10*6/uL Final    Hemoglobin 02/28/2024 14.3  12.0 - 16.0 g/dL Final    Hematocrit 02/28/2024 43.1  36.0 - 46.0 % Final    MCV 02/28/2024 87  80 - 100 fL Final    MCH 02/28/2024 28.9  26.0 - 34.0 pg Final    MCHC 02/28/2024 33.2  32.0 - 36.0 g/dL Final    RDW 02/28/2024 14.3  11.5 - 14.5 % Final    Platelets 02/28/2024 329  150 - 450 x10*3/uL Final    Neutrophils % 02/28/2024 44.2  40.0 - 80.0 % Final    Immature Granulocytes %, Automated 02/28/2024 0.6  0.0 - 0.9 % Final    Immature Granulocyte Count (IG) includes promyelocytes, myelocytes and metamyelocytes but does not include bands. Percent differential counts (%) should be interpreted in the context of the absolute cell counts (cells/UL).    Lymphocytes % 02/28/2024 43.5  13.0 - 44.0 % Final    Monocytes % 02/28/2024 6.5  2.0 - 10.0 % Final    Eosinophils % 02/28/2024 4.6  0.0 - 6.0 % Final    Basophils % 02/28/2024 0.6  0.0 - 2.0 % Final    Neutrophils Absolute 02/28/2024 3.08  1.20 - 7.70 x10*3/uL Final    Percent differential counts (%) should be interpreted in the context of the absolute cell counts (cells/uL).    Immature Granulocytes Absolute, Au* 02/28/2024 0.04  0.00 - 0.70 x10*3/uL Final    Lymphocytes Absolute 02/28/2024 3.03  1.20 - 4.80 x10*3/uL Final    Monocytes Absolute 02/28/2024 0.45  0.10 - 1.00 x10*3/uL Final    Eosinophils Absolute 02/28/2024 0.32  0.00 - 0.70 x10*3/uL Final    Basophils Absolute 02/28/2024 0.04  0.00 - 0.10 x10*3/uL Final     Current Outpatient Medications on File Prior to Visit   Medication Sig Dispense Refill    amLODIPine (Norvasc) 5 mg tablet Take 1 tablet (5 mg) by mouth once daily. 90 tablet 0    Blood glucose monitoring meter kit (FreeStyle Lite Meter) kit Test once daily fasting in the am. 1 each 0    blood sugar diagnostic (Freestyle InsuLinx) strip Test once  daily fasting in the am. 100 strip 3    lancets misc Test once daily fasting in the am. 100 each 3    levothyroxine (Synthroid, Levoxyl) 25 mcg tablet Take 1 tablet (25 mcg) by mouth once daily. 90 tablet 0    valACYclovir (Valtrex) 1 gram tablet Take by mouth.      [DISCONTINUED] amLODIPine (Norvasc) 5 mg tablet Take 1 tablet (5 mg) by mouth once daily. 90 tablet 0    [DISCONTINUED] levothyroxine (Synthroid, Levoxyl) 25 mcg tablet TAKE 1 TABLET BY MOUTH ONCE DAILY 90 tablet 0     No current facility-administered medications on file prior to visit.     No images are attached to the encounter.            Assessment/Plan   Diagnoses and all orders for this visit:  Primary hypertension  Type 2 diabetes mellitus without complication, without long-term current use of insulin (CMS/[facility])  Familial hypercholesterolemia  Patient to start on hydrochlorothiazide  Patient to call if questions or concerns

## 2024-04-23 ENCOUNTER — OFFICE VISIT (OUTPATIENT)
Dept: PRIMARY CARE | Facility: CLINIC | Age: 59
End: 2024-04-23
Payer: COMMERCIAL

## 2024-04-23 VITALS
HEART RATE: 95 BPM | DIASTOLIC BLOOD PRESSURE: 82 MMHG | BODY MASS INDEX: 35.58 KG/M2 | SYSTOLIC BLOOD PRESSURE: 124 MMHG | WEIGHT: 191.4 LBS

## 2024-04-23 DIAGNOSIS — E11.9 TYPE 2 DIABETES MELLITUS WITHOUT COMPLICATION, WITHOUT LONG-TERM CURRENT USE OF INSULIN (MULTI): ICD-10-CM

## 2024-04-23 DIAGNOSIS — I10 PRIMARY HYPERTENSION: Primary | ICD-10-CM

## 2024-04-23 DIAGNOSIS — E78.01 FAMILIAL HYPERCHOLESTEROLEMIA: ICD-10-CM

## 2024-04-23 PROCEDURE — 99214 OFFICE O/P EST MOD 30 MIN: CPT | Performed by: FAMILY MEDICINE

## 2024-04-23 PROCEDURE — 3050F LDL-C >= 130 MG/DL: CPT | Performed by: FAMILY MEDICINE

## 2024-04-23 PROCEDURE — 3079F DIAST BP 80-89 MM HG: CPT | Performed by: FAMILY MEDICINE

## 2024-04-23 PROCEDURE — 3074F SYST BP LT 130 MM HG: CPT | Performed by: FAMILY MEDICINE

## 2024-04-23 PROCEDURE — 3044F HG A1C LEVEL LT 7.0%: CPT | Performed by: FAMILY MEDICINE

## 2024-04-23 PROCEDURE — 1036F TOBACCO NON-USER: CPT | Performed by: FAMILY MEDICINE

## 2024-04-23 ASSESSMENT — ENCOUNTER SYMPTOMS
BACK PAIN: 0
ACTIVITY CHANGE: 0
HEADACHES: 0
FACIAL ASYMMETRY: 0
COLOR CHANGE: 0
CHOKING: 0
FEVER: 0
DIZZINESS: 0
COUGH: 0
APPETITE CHANGE: 0
PALPITATIONS: 0
CHEST TIGHTNESS: 0
ARTHRALGIAS: 0
LIGHT-HEADEDNESS: 0
FATIGUE: 0

## 2024-04-23 ASSESSMENT — PATIENT HEALTH QUESTIONNAIRE - PHQ9
2. FEELING DOWN, DEPRESSED OR HOPELESS: NOT AT ALL
3. TROUBLE FALLING OR STAYING ASLEEP OR SLEEPING TOO MUCH: NOT AT ALL
6. FEELING BAD ABOUT YOURSELF - OR THAT YOU ARE A FAILURE OR HAVE LET YOURSELF OR YOUR FAMILY DOWN: NOT AT ALL
7. TROUBLE CONCENTRATING ON THINGS, SUCH AS READING THE NEWSPAPER OR WATCHING TELEVISION: NOT AT ALL
SUM OF ALL RESPONSES TO PHQ9 QUESTIONS 1 AND 2: 0
5. POOR APPETITE OR OVEREATING: NOT AT ALL
8. MOVING OR SPEAKING SO SLOWLY THAT OTHER PEOPLE COULD HAVE NOTICED. OR THE OPPOSITE, BEING SO FIGETY OR RESTLESS THAT YOU HAVE BEEN MOVING AROUND A LOT MORE THAN USUAL: NOT AT ALL
4. FEELING TIRED OR HAVING LITTLE ENERGY: NOT AT ALL
9. THOUGHTS THAT YOU WOULD BE BETTER OFF DEAD, OR OF HURTING YOURSELF: NOT AT ALL
10. IF YOU CHECKED OFF ANY PROBLEMS, HOW DIFFICULT HAVE THESE PROBLEMS MADE IT FOR YOU TO DO YOUR WORK, TAKE CARE OF THINGS AT HOME, OR GET ALONG WITH OTHER PEOPLE: NOT DIFFICULT AT ALL
1. LITTLE INTEREST OR PLEASURE IN DOING THINGS: NOT AT ALL
SUM OF ALL RESPONSES TO PHQ QUESTIONS 1-9: 0

## 2024-04-23 NOTE — PROGRESS NOTES
Subjective   Patient ID: Jigna Forrest is a 58 y.o. female who presents for Follow-up (BP ).    HPI   Patient reports she is doing well on her blood pressure medication. She has lost 8 lbs and is continuing to do her diet and exercise.     Review of Systems   Constitutional:  Negative for activity change, appetite change, fatigue and fever.   HENT:  Negative for congestion.    Respiratory:  Negative for cough, choking and chest tightness.    Cardiovascular:  Negative for chest pain, palpitations and leg swelling.   Musculoskeletal:  Negative for arthralgias, back pain and gait problem.   Skin:  Negative for color change and pallor.   Neurological:  Negative for dizziness, facial asymmetry, light-headedness and headaches.       Objective   /82 (BP Location: Left arm)   Pulse 95   Wt 86.8 kg (191 lb 6.4 oz)   BMI 35.58 kg/m²   BSA Body surface area is 1.94 meters squared.      Physical Exam  Constitutional:       General: She is not in acute distress.     Appearance: Normal appearance. She is not toxic-appearing.   HENT:      Head: Normocephalic.      Right Ear: Tympanic membrane, ear canal and external ear normal.      Left Ear: Tympanic membrane, ear canal and external ear normal.   Eyes:      Conjunctiva/sclera: Conjunctivae normal.      Pupils: Pupils are equal, round, and reactive to light.   Cardiovascular:      Rate and Rhythm: Normal rate and regular rhythm.      Pulses: Normal pulses.      Heart sounds: Normal heart sounds.   Pulmonary:      Effort: No respiratory distress.      Breath sounds: No wheezing, rhonchi or rales.   Musculoskeletal:         General: No swelling or tenderness.   Skin:     Findings: No lesion or rash.   Neurological:      General: No focal deficit present.      Mental Status: She is alert and oriented to person, place, and time. Mental status is at baseline.      Gait: Gait normal.   Psychiatric:         Mood and Affect: Mood normal.         Behavior: Behavior normal.          Thought Content: Thought content normal.         Judgment: Judgment normal.       Lab on 02/28/2024   Component Date Value Ref Range Status    Cholesterol 02/28/2024 265 (H)  0 - 199 mg/dL Final          Age      Desirable   Borderline High   High     0-19 Y     0 - 169       170 - 199     >/= 200    20-24 Y     0 - 189       190 - 224     >/= 225         >24 Y     0 - 199       200 - 239     >/= 240   **All ranges are based on fasting samples. Specific   therapeutic targets will vary based on patient-specific   cardiac risk.    Pediatric guidelines reference:Pediatrics 2011, 128(S5).Adult guidelines reference: NCEP ATPIII Guidelines,PAYAL 2001, 258:2486-97    Venipuncture immediately after or during the administration of Metamizole may lead to falsely low results. Testing should be performed immediately prior to Metamizole dosing.    HDL-Cholesterol 02/28/2024 55.0  mg/dL Final      Age       Very Low   Low     Normal    High    0-19 Y    < 35      < 40     40-45     ----  20-24 Y    ----     < 40      >45      ----        >24 Y      ----     < 40     40-60      >60      Cholesterol/HDL Ratio 02/28/2024 4.8   Final      Ref Values  Desirable  < 3.4  High Risk  > 5.0    LDL Calculated 02/28/2024 185 (H)  <=99 mg/dL Final                                Near   Borderline      AGE      Desirable  Optimal    High     High     Very High     0-19 Y     0 - 109     ---    110-129   >/= 130     ----    20-24 Y     0 - 119     ---    120-159   >/= 160     ----      >24 Y     0 -  99   100-129  130-159   160-189     >/=190      VLDL 02/28/2024 25  0 - 40 mg/dL Final    Triglycerides 02/28/2024 127  0 - 149 mg/dL Final       Age         Desirable   Borderline High   High     Very High   0 D-90 D    19 - 174         ----         ----        ----  91 D- 9 Y     0 -  74        75 -  99     >/= 100      ----    10-19 Y     0 -  89        90 - 129     >/= 130      ----    20-24 Y     0 - 114       115 - 149     >/= 150      ----          >24 Y     0 - 149       150 - 199    200- 499    >/= 500    Venipuncture immediately after or during the administration of Metamizole may lead to falsely low results. Testing should be performed immediately prior to Metamizole dosing.    Non HDL Cholesterol 02/28/2024 210 (H)  0 - 149 mg/dL Final          Age       Desirable   Borderline High   High     Very High     0-19 Y     0 - 119       120 - 144     >/= 145    >/= 160    20-24 Y     0 - 149       150 - 189     >/= 190      ----         >24 Y    30 mg/dL above LDL Cholesterol goal      Glucose 02/28/2024 125 (H)  74 - 99 mg/dL Final    Sodium 02/28/2024 143  136 - 145 mmol/L Final    Potassium 02/28/2024 4.6  3.5 - 5.3 mmol/L Final    Chloride 02/28/2024 106  98 - 107 mmol/L Final    Bicarbonate 02/28/2024 30  21 - 32 mmol/L Final    Anion Gap 02/28/2024 12  10 - 20 mmol/L Final    Urea Nitrogen 02/28/2024 18  6 - 23 mg/dL Final    Creatinine 02/28/2024 0.86  0.50 - 1.05 mg/dL Final    eGFR 02/28/2024 79  >60 mL/min/1.73m*2 Final    Calculations of estimated GFR are performed using the 2021 CKD-EPI Study Refit equation without the race variable for the IDMS-Traceable creatinine methods.  https://jasn.asnjournals.org/content/early/2021/09/22/ASN.8613973618    Calcium 02/28/2024 9.4  8.6 - 10.6 mg/dL Final    Albumin 02/28/2024 4.5  3.4 - 5.0 g/dL Final    Alkaline Phosphatase 02/28/2024 104  33 - 110 U/L Final    Total Protein 02/28/2024 7.2  6.4 - 8.2 g/dL Final    AST 02/28/2024 19  9 - 39 U/L Final    Bilirubin, Total 02/28/2024 0.4  0.0 - 1.2 mg/dL Final    ALT 02/28/2024 25  7 - 45 U/L Final    Patients treated with Sulfasalazine may generate falsely decreased results for ALT.    Thyroid Stimulating Hormone 02/28/2024 3.85  0.44 - 3.98 mIU/L Final    Vitamin D, 25-Hydroxy, Total 02/28/2024 19 (L)  30 - 100 ng/mL Final    Hemoglobin A1C 02/28/2024 6.6 (H)  see below % Final    Estimated Average Glucose 02/28/2024 143  Not Established mg/dL Final    WBC  02/28/2024 7.0  4.4 - 11.3 x10*3/uL Final    nRBC 02/28/2024 0.0  0.0 - 0.0 /100 WBCs Final    RBC 02/28/2024 4.94  4.00 - 5.20 x10*6/uL Final    Hemoglobin 02/28/2024 14.3  12.0 - 16.0 g/dL Final    Hematocrit 02/28/2024 43.1  36.0 - 46.0 % Final    MCV 02/28/2024 87  80 - 100 fL Final    MCH 02/28/2024 28.9  26.0 - 34.0 pg Final    MCHC 02/28/2024 33.2  32.0 - 36.0 g/dL Final    RDW 02/28/2024 14.3  11.5 - 14.5 % Final    Platelets 02/28/2024 329  150 - 450 x10*3/uL Final    Neutrophils % 02/28/2024 44.2  40.0 - 80.0 % Final    Immature Granulocytes %, Automated 02/28/2024 0.6  0.0 - 0.9 % Final    Immature Granulocyte Count (IG) includes promyelocytes, myelocytes and metamyelocytes but does not include bands. Percent differential counts (%) should be interpreted in the context of the absolute cell counts (cells/UL).    Lymphocytes % 02/28/2024 43.5  13.0 - 44.0 % Final    Monocytes % 02/28/2024 6.5  2.0 - 10.0 % Final    Eosinophils % 02/28/2024 4.6  0.0 - 6.0 % Final    Basophils % 02/28/2024 0.6  0.0 - 2.0 % Final    Neutrophils Absolute 02/28/2024 3.08  1.20 - 7.70 x10*3/uL Final    Percent differential counts (%) should be interpreted in the context of the absolute cell counts (cells/uL).    Immature Granulocytes Absolute, Au* 02/28/2024 0.04  0.00 - 0.70 x10*3/uL Final    Lymphocytes Absolute 02/28/2024 3.03  1.20 - 4.80 x10*3/uL Final    Monocytes Absolute 02/28/2024 0.45  0.10 - 1.00 x10*3/uL Final    Eosinophils Absolute 02/28/2024 0.32  0.00 - 0.70 x10*3/uL Final    Basophils Absolute 02/28/2024 0.04  0.00 - 0.10 x10*3/uL Final     Current Outpatient Medications on File Prior to Visit   Medication Sig Dispense Refill    amLODIPine (Norvasc) 5 mg tablet Take 1 tablet (5 mg) by mouth once daily. 90 tablet 0    Blood glucose monitoring meter kit (FreeStyle Lite Meter) kit Test once daily fasting in the am. 1 each 0    blood sugar diagnostic (Freestyle Insu[employer]x) strip Test once daily fasting in the am. 100  strip 3    hydroCHLOROthiazide (HYDRODiuril) 25 mg tablet Take 1 tablet (25 mg) by mouth once daily. 30 tablet 11    lancets misc Test once daily fasting in the am. 100 each 3    levothyroxine (Synthroid, Levoxyl) 25 mcg tablet Take 1 tablet (25 mcg) by mouth once daily. 90 tablet 0    valACYclovir (Valtrex) 1 gram tablet Take by mouth.       No current facility-administered medications on file prior to visit.     No images are attached to the encounter.            Assessment/Plan   Diagnoses and all orders for this visit:  Primary hypertension  Type 2 diabetes mellitus without complication, without long-term current use of insulin (Multi)  Familial hypercholesterolemia    Patient to continue on hydrochlorothiazide  2.   Patient to call if questions or concerns

## 2024-04-26 ENCOUNTER — TELEMEDICINE (OUTPATIENT)
Dept: PHARMACY | Facility: HOSPITAL | Age: 59
End: 2024-04-26
Payer: COMMERCIAL

## 2024-04-26 DIAGNOSIS — E78.01 FAMILIAL HYPERCHOLESTEROLEMIA: ICD-10-CM

## 2024-04-26 DIAGNOSIS — E11.9 TYPE 2 DIABETES MELLITUS WITHOUT COMPLICATION, WITHOUT LONG-TERM CURRENT USE OF INSULIN (MULTI): ICD-10-CM

## 2024-04-26 RX ORDER — METFORMIN HYDROCHLORIDE 500 MG/1
500 TABLET, EXTENDED RELEASE ORAL
Qty: 30 TABLET | Refills: 2 | Status: SHIPPED | OUTPATIENT
Start: 2024-04-26 | End: 2024-04-30 | Stop reason: SDUPTHER

## 2024-04-26 NOTE — ASSESSMENT & PLAN NOTE
ASSESSMENT:  -Patient's most recent A1c elevated at 6.6%; now indicated for medication to treat diabetes.  -Commended patient on current lifestyle modifications and recent weight loss.  -Discussed starting Metformin vs. Ozempic at last visit. Patient is agreeable to start Metformin based on recent weight loss and occasional BG spikes after eating.  -As patient has made significant diet changes and has lost weight, would like repeat lipid panel prior to starting any cholesterol medications.     PATIENT EDUCATION:  - Counseled patient on Metformin MOA, expectations, side effects, duration of therapy, administration, and monitoring parameters.  -Reviewed benefits of Metformin which include BG regulation, increased insulin sensitivity and maintenance of weight loss.   -Discussed extended-release formulation and administering with food to minimize GI upset.       RECOMMENDATIONS/PLAN:     START Metformin  mg once daily with largest meal. Prescription sent to home pharmacy.  Will defer cholesterol therapy for now until patient gets repeat labwork in May.   Will follow-up with patient in 1 month for assessment of current therapy.

## 2024-04-26 NOTE — PROGRESS NOTES
[facility]: Follow-Up Visit     Subjective   Patient ID: Jigna Forrest is a 58 y.o. female who presents for Diabetes.    Referring Provider: Kristal Luis DO     Patient presents to Clinical Pharmacy for follow up in regards to diabetes medication management.     Since last visit, patient has been working on diet (intermittent fasting) and has been able to lose ~20 pounds. She notes that FBG ranges from 117-122 and PPG in the 140s. She is interested in starting Metformin to help regulate her BG spikes.      Objective     There were no vitals taken for this visit.     Labs  Lab Results   Component Value Date    BILITOT 0.4 02/28/2024    CALCIUM 9.4 02/28/2024    CO2 30 02/28/2024     02/28/2024    CREATININE 0.86 02/28/2024    GLUCOSE 125 (H) 02/28/2024    ALKPHOS 104 02/28/2024    K 4.6 02/28/2024    PROT 7.2 02/28/2024     02/28/2024    AST 19 02/28/2024    ALT 25 02/28/2024    BUN 18 02/28/2024    ANIONGAP 12 02/28/2024    ALBUMIN 4.5 02/28/2024    AMYLASE 44 09/30/2020    LIPASE 46 09/30/2020    GFRF 82 11/22/2022     Lab Results   Component Value Date    TRIG 127 02/28/2024    CHOL 265 (H) 02/28/2024    LDLCALC 185 (H) 02/28/2024    HDL 55.0 02/28/2024     Lab Results   Component Value Date    HGBA1C 6.6 (H) 02/28/2024       Current Outpatient Medications on File Prior to Visit   Medication Sig Dispense Refill    amLODIPine (Norvasc) 5 mg tablet Take 1 tablet (5 mg) by mouth once daily. 90 tablet 0    Blood glucose monitoring meter kit (FreeStyle Lite Meter) kit Test once daily fasting in the am. 1 each 0    blood sugar diagnostic (Freestyle [employer]x) strip Test once daily fasting in the am. 100 strip 3    hydroCHLOROthiazide (HYDRODiuril) 25 mg tablet Take 1 tablet (25 mg) by mouth once daily. 30 tablet 11    lancets misc Test once daily fasting in the am. 100 each 3    levothyroxine (Synthroid, Levoxyl) 25 mcg tablet Take 1 tablet (25 mcg) by mouth once daily. 90 tablet 0     valACYclovir (Valtrex) 1 gram tablet Take by mouth.       No current facility-administered medications on file prior to visit.        Assessment/Plan   Problem List Items Addressed This Visit             ICD-10-CM    Type 2 diabetes mellitus without complication, without long-term current use of insulin (Multi) E11.9     ASSESSMENT:  -Patient's most recent A1c elevated at 6.6%; now indicated for medication to treat diabetes.  -Commended patient on current lifestyle modifications and recent weight loss.  -Discussed starting Metformin vs. Ozempic at last visit. Patient is agreeable to start Metformin based on recent weight loss and occasional BG spikes after eating.  -As patient has made significant diet changes and has lost weight, would like repeat lipid panel prior to starting any cholesterol medications.     PATIENT EDUCATION:  - Counseled patient on Metformin MOA, expectations, side effects, duration of therapy, administration, and monitoring parameters.  -Reviewed benefits of Metformin which include BG regulation, increased insulin sensitivity and maintenance of weight loss.   -Discussed extended-release formulation and administering with food to minimize GI upset.       RECOMMENDATIONS/PLAN:     START Metformin  mg once daily with largest meal. Prescription sent to home pharmacy.  Will defer cholesterol therapy for now until patient gets repeat labwork in May.   Will follow-up with patient in 1 month for assessment of current therapy.          Other Visit Diagnoses         Codes    Familial hypercholesterolemia     E78.01          Radha Salamanca PharmD  Clinical Ambulatory Care Pharmacist  Ph: [phone]    Continue all meds under the continuation of care with the referring provider and clinical pharmacy team.    Clinical Pharmacist follow up: 5/24/24 or sooner as needed based on clinical intervention  Type of Encounter:  Telephone    Verbal consent to manage patient's drug therapy was obtained from  the patient. They were informed they may decline to participate or withdraw from participation in pharmacy services at any time.

## 2024-04-30 DIAGNOSIS — E11.9 TYPE 2 DIABETES MELLITUS WITHOUT COMPLICATION, WITHOUT LONG-TERM CURRENT USE OF INSULIN (MULTI): ICD-10-CM

## 2024-04-30 RX ORDER — METFORMIN HYDROCHLORIDE 500 MG/1
500 TABLET, EXTENDED RELEASE ORAL
Qty: 30 TABLET | Refills: 2 | Status: SHIPPED | OUTPATIENT
Start: 2024-04-30 | End: 2025-06-04

## 2024-05-21 ENCOUNTER — OFFICE VISIT (OUTPATIENT)
Dept: PRIMARY CARE | Facility: CLINIC | Age: 59
End: 2024-05-21
Payer: COMMERCIAL

## 2024-05-21 VITALS
SYSTOLIC BLOOD PRESSURE: 142 MMHG | WEIGHT: 190 LBS | DIASTOLIC BLOOD PRESSURE: 90 MMHG | BODY MASS INDEX: 35.32 KG/M2 | HEART RATE: 96 BPM

## 2024-05-21 DIAGNOSIS — E11.9 TYPE 2 DIABETES MELLITUS WITHOUT COMPLICATION, WITHOUT LONG-TERM CURRENT USE OF INSULIN (MULTI): ICD-10-CM

## 2024-05-21 DIAGNOSIS — I10 PRIMARY HYPERTENSION: Primary | ICD-10-CM

## 2024-05-21 DIAGNOSIS — E78.01 FAMILIAL HYPERCHOLESTEROLEMIA: ICD-10-CM

## 2024-05-21 PROCEDURE — 3044F HG A1C LEVEL LT 7.0%: CPT | Performed by: FAMILY MEDICINE

## 2024-05-21 PROCEDURE — 3077F SYST BP >= 140 MM HG: CPT | Performed by: FAMILY MEDICINE

## 2024-05-21 PROCEDURE — 3050F LDL-C >= 130 MG/DL: CPT | Performed by: FAMILY MEDICINE

## 2024-05-21 PROCEDURE — 99214 OFFICE O/P EST MOD 30 MIN: CPT | Performed by: FAMILY MEDICINE

## 2024-05-21 PROCEDURE — 3080F DIAST BP >= 90 MM HG: CPT | Performed by: FAMILY MEDICINE

## 2024-05-21 RX ORDER — AMLODIPINE BESYLATE 5 MG/1
7.5 TABLET ORAL DAILY
Qty: 135 TABLET | Refills: 3 | Status: SHIPPED | OUTPATIENT
Start: 2024-05-21

## 2024-05-21 ASSESSMENT — ENCOUNTER SYMPTOMS
COUGH: 0
COLOR CHANGE: 0
FATIGUE: 0
FEVER: 0
CHOKING: 0
CHEST TIGHTNESS: 0
FACIAL ASYMMETRY: 0
ACTIVITY CHANGE: 0
PALPITATIONS: 0
DIZZINESS: 0
LIGHT-HEADEDNESS: 0
ARTHRALGIAS: 0
BACK PAIN: 0
APPETITE CHANGE: 0
HEADACHES: 0

## 2024-05-21 NOTE — PROGRESS NOTES
Subjective   Patient ID: Jigna Forrest is a 58 y.o. female who presents for Follow-up (BP Home device: 137/92  pulse 94).    HPI   Patient reports she is doing well on her blood pressure medication. She has lost 9 lbs and is continuing to do her diet and exercise. She has been getting blood pressures 150s/90s at home. She is doing better with her water intake 60oz/day.    Review of Systems   Constitutional:  Negative for activity change, appetite change, fatigue and fever.   HENT:  Negative for congestion.    Respiratory:  Negative for cough, choking and chest tightness.    Cardiovascular:  Negative for chest pain, palpitations and leg swelling.   Musculoskeletal:  Negative for arthralgias, back pain and gait problem.   Skin:  Negative for color change and pallor.   Neurological:  Negative for dizziness, facial asymmetry, light-headedness and headaches.       Objective   /90 (BP Location: Right arm)   Pulse 96   Wt 86.2 kg (190 lb)   BMI 35.32 kg/m²   BSA Body surface area is 1.93 meters squared.      Physical Exam  Constitutional:       General: She is not in acute distress.     Appearance: Normal appearance. She is not toxic-appearing.   HENT:      Head: Normocephalic.      Right Ear: Tympanic membrane, ear canal and external ear normal.      Left Ear: Tympanic membrane, ear canal and external ear normal.   Eyes:      Conjunctiva/sclera: Conjunctivae normal.      Pupils: Pupils are equal, round, and reactive to light.   Cardiovascular:      Rate and Rhythm: Normal rate and regular rhythm.      Pulses: Normal pulses.      Heart sounds: Normal heart sounds.   Pulmonary:      Effort: No respiratory distress.      Breath sounds: No wheezing, rhonchi or rales.   Musculoskeletal:         General: No swelling or tenderness.   Skin:     Findings: No lesion or rash.   Neurological:      General: No focal deficit present.      Mental Status: She is alert and oriented to person, place, and time. Mental status is at  baseline.      Gait: Gait normal.   Psychiatric:         Mood and Affect: Mood normal.         Behavior: Behavior normal.         Thought Content: Thought content normal.         Judgment: Judgment normal.       Lab on 02/28/2024   Component Date Value Ref Range Status    Cholesterol 02/28/2024 265 (H)  0 - 199 mg/dL Final          Age      Desirable   Borderline High   High     0-19 Y     0 - 169       170 - 199     >/= 200    20-24 Y     0 - 189       190 - 224     >/= 225         >24 Y     0 - 199       200 - 239     >/= 240   **All ranges are based on fasting samples. Specific   therapeutic targets will vary based on patient-specific   cardiac risk.    Pediatric guidelines reference:Pediatrics 2011, 128(S5).Adult guidelines reference: NCEP ATPIII Guidelines,PAAYL 2001, 258:2486-97    Venipuncture immediately after or during the administration of Metamizole may lead to falsely low results. Testing should be performed immediately prior to Metamizole dosing.    HDL-Cholesterol 02/28/2024 55.0  mg/dL Final      Age       Very Low   Low     Normal    High    0-19 Y    < 35      < 40     40-45     ----  20-24 Y    ----     < 40      >45      ----        >24 Y      ----     < 40     40-60      >60      Cholesterol/HDL Ratio 02/28/2024 4.8   Final      Ref Values  Desirable  < 3.4  High Risk  > 5.0    LDL Calculated 02/28/2024 185 (H)  <=99 mg/dL Final                                Near   Borderline      AGE      Desirable  Optimal    High     High     Very High     0-19 Y     0 - 109     ---    110-129   >/= 130     ----    20-24 Y     0 - 119     ---    120-159   >/= 160     ----      >24 Y     0 -  99   100-129  130-159   160-189     >/=190      VLDL 02/28/2024 25  0 - 40 mg/dL Final    Triglycerides 02/28/2024 127  0 - 149 mg/dL Final       Age         Desirable   Borderline High   High     Very High   0 D-90 D    19 - 174         ----         ----        ----  91 D- 9 Y     0 -  74        75 -  99     >/= 100       ----    10-19 Y     0 -  89        90 - 129     >/= 130      ----    20-24 Y     0 - 114       115 - 149     >/= 150      ----         >24 Y     0 - 149       150 - 199    200- 499    >/= 500    Venipuncture immediately after or during the administration of Metamizole may lead to falsely low results. Testing should be performed immediately prior to Metamizole dosing.    Non HDL Cholesterol 02/28/2024 210 (H)  0 - 149 mg/dL Final          Age       Desirable   Borderline High   High     Very High     0-19 Y     0 - 119       120 - 144     >/= 145    >/= 160    20-24 Y     0 - 149       150 - 189     >/= 190      ----         >24 Y    30 mg/dL above LDL Cholesterol goal      Glucose 02/28/2024 125 (H)  74 - 99 mg/dL Final    Sodium 02/28/2024 143  136 - 145 mmol/L Final    Potassium 02/28/2024 4.6  3.5 - 5.3 mmol/L Final    Chloride 02/28/2024 106  98 - 107 mmol/L Final    Bicarbonate 02/28/2024 30  21 - 32 mmol/L Final    Anion Gap 02/28/2024 12  10 - 20 mmol/L Final    Urea Nitrogen 02/28/2024 18  6 - 23 mg/dL Final    Creatinine 02/28/2024 0.86  0.50 - 1.05 mg/dL Final    eGFR 02/28/2024 79  >60 mL/min/1.73m*2 Final    Calculations of estimated GFR are performed using the 2021 CKD-EPI Study Refit equation without the race variable for the IDMS-Traceable creatinine methods.  https://jasn.asnjournals.org/content/early/2021/09/22/ASN.1418272644    Calcium 02/28/2024 9.4  8.6 - 10.6 mg/dL Final    Albumin 02/28/2024 4.5  3.4 - 5.0 g/dL Final    Alkaline Phosphatase 02/28/2024 104  33 - 110 U/L Final    Total Protein 02/28/2024 7.2  6.4 - 8.2 g/dL Final    AST 02/28/2024 19  9 - 39 U/L Final    Bilirubin, Total 02/28/2024 0.4  0.0 - 1.2 mg/dL Final    ALT 02/28/2024 25  7 - 45 U/L Final    Patients treated with Sulfasalazine may generate falsely decreased results for ALT.    Thyroid Stimulating Hormone 02/28/2024 3.85  0.44 - 3.98 mIU/L Final    Vitamin D, 25-Hydroxy, Total 02/28/2024 19 (L)  30 - 100 ng/mL Final     Hemoglobin A1C 02/28/2024 6.6 (H)  see below % Final    Estimated Average Glucose 02/28/2024 143  Not Established mg/dL Final    WBC 02/28/2024 7.0  4.4 - 11.3 x10*3/uL Final    nRBC 02/28/2024 0.0  0.0 - 0.0 /100 WBCs Final    RBC 02/28/2024 4.94  4.00 - 5.20 x10*6/uL Final    Hemoglobin 02/28/2024 14.3  12.0 - 16.0 g/dL Final    Hematocrit 02/28/2024 43.1  36.0 - 46.0 % Final    MCV 02/28/2024 87  80 - 100 fL Final    MCH 02/28/2024 28.9  26.0 - 34.0 pg Final    MCHC 02/28/2024 33.2  32.0 - 36.0 g/dL Final    RDW 02/28/2024 14.3  11.5 - 14.5 % Final    Platelets 02/28/2024 329  150 - 450 x10*3/uL Final    Neutrophils % 02/28/2024 44.2  40.0 - 80.0 % Final    Immature Granulocytes %, Automated 02/28/2024 0.6  0.0 - 0.9 % Final    Immature Granulocyte Count (IG) includes promyelocytes, myelocytes and metamyelocytes but does not include bands. Percent differential counts (%) should be interpreted in the context of the absolute cell counts (cells/UL).    Lymphocytes % 02/28/2024 43.5  13.0 - 44.0 % Final    Monocytes % 02/28/2024 6.5  2.0 - 10.0 % Final    Eosinophils % 02/28/2024 4.6  0.0 - 6.0 % Final    Basophils % 02/28/2024 0.6  0.0 - 2.0 % Final    Neutrophils Absolute 02/28/2024 3.08  1.20 - 7.70 x10*3/uL Final    Percent differential counts (%) should be interpreted in the context of the absolute cell counts (cells/uL).    Immature Granulocytes Absolute, Au* 02/28/2024 0.04  0.00 - 0.70 x10*3/uL Final    Lymphocytes Absolute 02/28/2024 3.03  1.20 - 4.80 x10*3/uL Final    Monocytes Absolute 02/28/2024 0.45  0.10 - 1.00 x10*3/uL Final    Eosinophils Absolute 02/28/2024 0.32  0.00 - 0.70 x10*3/uL Final    Basophils Absolute 02/28/2024 0.04  0.00 - 0.10 x10*3/uL Final     Current Outpatient Medications on File Prior to Visit   Medication Sig Dispense Refill    amLODIPine (Norvasc) 5 mg tablet Take 1 tablet (5 mg) by mouth once daily. 90 tablet 0    Blood glucose monitoring meter kit (FreeStyle Lite Meter) kit Test  once daily fasting in the am. 1 each 0    blood sugar diagnostic (Freestyle InsuLinx) strip Test once daily fasting in the am. 100 strip 3    hydroCHLOROthiazide (HYDRODiuril) 25 mg tablet Take 1 tablet (25 mg) by mouth once daily. 30 tablet 11    lancets misc Test once daily fasting in the am. 100 each 3    levothyroxine (Synthroid, Levoxyl) 25 mcg tablet Take 1 tablet (25 mcg) by mouth once daily. 90 tablet 0    metFORMIN XR (Glucophage-XR) 500 mg 24 hr tablet Take 1 tablet (500 mg) by mouth once daily with breakfast. Do not crush, chew, or split. 30 tablet 2    valACYclovir (Valtrex) 1 gram tablet Take by mouth.       No current facility-administered medications on file prior to visit.     No images are attached to the encounter.            Assessment/Plan   Diagnoses and all orders for this visit:  Primary hypertension  Type 2 diabetes mellitus without complication, without long-term current use of insulin (Multi)  Familial hypercholesterolemia      Patient to continue on hydrochlorothiazide increase dose of amlodipine to 7.5 mg daily  2.   Patient to call if questions or concerns

## 2024-06-04 ASSESSMENT — ENCOUNTER SYMPTOMS
CHOKING: 0
FEVER: 0
COUGH: 0
PALPITATIONS: 0
FATIGUE: 0
APPETITE CHANGE: 0
ARTHRALGIAS: 0
COLOR CHANGE: 0
LIGHT-HEADEDNESS: 0
DIZZINESS: 0
ACTIVITY CHANGE: 0
CHEST TIGHTNESS: 0
BACK PAIN: 0
HEADACHES: 0
FACIAL ASYMMETRY: 0

## 2024-06-04 NOTE — PROGRESS NOTES
Subjective   Patient ID: Jigna Forrest is a 58 y.o. female who presents for Follow-up (BP/Home device: 131/91 pulse 87).    HPI   Patient reports she is doing well on her blood pressure medication. She has lost 9 lbs and is continuing to do her diet and exercise. She has been getting blood pressures 140s/90s at home. She is doing better with her water intake 60oz/day.    She feels exhausted. She is getting muscle cramps.     Review of Systems   Constitutional:  Negative for activity change, appetite change, fatigue and fever.   HENT:  Negative for congestion.    Respiratory:  Negative for cough, choking and chest tightness.    Cardiovascular:  Negative for chest pain, palpitations and leg swelling.   Musculoskeletal:  Negative for arthralgias, back pain and gait problem.   Skin:  Negative for color change and pallor.   Neurological:  Negative for dizziness, facial asymmetry, light-headedness and headaches.       Objective   /82 (BP Location: Right arm)   Pulse 89   Wt 84.6 kg (186 lb 6.4 oz)   BMI 34.65 kg/m²   BSA Body surface area is 1.92 meters squared.      Physical Exam  Constitutional:       General: She is not in acute distress.     Appearance: Normal appearance. She is not toxic-appearing.   HENT:      Head: Normocephalic.      Right Ear: Tympanic membrane, ear canal and external ear normal.      Left Ear: Tympanic membrane, ear canal and external ear normal.   Eyes:      Conjunctiva/sclera: Conjunctivae normal.      Pupils: Pupils are equal, round, and reactive to light.   Cardiovascular:      Rate and Rhythm: Normal rate and regular rhythm.      Pulses: Normal pulses.      Heart sounds: Normal heart sounds.   Pulmonary:      Effort: No respiratory distress.      Breath sounds: No wheezing, rhonchi or rales.   Musculoskeletal:         General: No swelling or tenderness.   Skin:     Findings: No lesion or rash.   Neurological:      General: No focal deficit present.      Mental Status: She is alert  and oriented to person, place, and time. Mental status is at baseline.      Gait: Gait normal.   Psychiatric:         Mood and Affect: Mood normal.         Behavior: Behavior normal.         Thought Content: Thought content normal.         Judgment: Judgment normal.       Lab on 02/28/2024   Component Date Value Ref Range Status    Cholesterol 02/28/2024 265 (H)  0 - 199 mg/dL Final          Age      Desirable   Borderline High   High     0-19 Y     0 - 169       170 - 199     >/= 200    20-24 Y     0 - 189       190 - 224     >/= 225         >24 Y     0 - 199       200 - 239     >/= 240   **All ranges are based on fasting samples. Specific   therapeutic targets will vary based on patient-specific   cardiac risk.    Pediatric guidelines reference:Pediatrics 2011, 128(S5).Adult guidelines reference: NCEP ATPIII Guidelines,PAYAL 2001, 258:2486-97    Venipuncture immediately after or during the administration of Metamizole may lead to falsely low results. Testing should be performed immediately prior to Metamizole dosing.    HDL-Cholesterol 02/28/2024 55.0  mg/dL Final      Age       Very Low   Low     Normal    High    0-19 Y    < 35      < 40     40-45     ----  20-24 Y    ----     < 40      >45      ----        >24 Y      ----     < 40     40-60      >60      Cholesterol/HDL Ratio 02/28/2024 4.8   Final      Ref Values  Desirable  < 3.4  High Risk  > 5.0    LDL Calculated 02/28/2024 185 (H)  <=99 mg/dL Final                                Near   Borderline      AGE      Desirable  Optimal    High     High     Very High     0-19 Y     0 - 109     ---    110-129   >/= 130     ----    20-24 Y     0 - 119     ---    120-159   >/= 160     ----      >24 Y     0 -  99   100-129  130-159   160-189     >/=190      VLDL 02/28/2024 25  0 - 40 mg/dL Final    Triglycerides 02/28/2024 127  0 - 149 mg/dL Final       Age         Desirable   Borderline High   High     Very High   0 D-90 D    19 - 174         ----         ----         ----  91 D- 9 Y     0 -  74        75 -  99     >/= 100      ----    10-19 Y     0 -  89        90 - 129     >/= 130      ----    20-24 Y     0 - 114       115 - 149     >/= 150      ----         >24 Y     0 - 149       150 - 199    200- 499    >/= 500    Venipuncture immediately after or during the administration of Metamizole may lead to falsely low results. Testing should be performed immediately prior to Metamizole dosing.    Non HDL Cholesterol 02/28/2024 210 (H)  0 - 149 mg/dL Final          Age       Desirable   Borderline High   High     Very High     0-19 Y     0 - 119       120 - 144     >/= 145    >/= 160    20-24 Y     0 - 149       150 - 189     >/= 190      ----         >24 Y    30 mg/dL above LDL Cholesterol goal      Glucose 02/28/2024 125 (H)  74 - 99 mg/dL Final    Sodium 02/28/2024 143  136 - 145 mmol/L Final    Potassium 02/28/2024 4.6  3.5 - 5.3 mmol/L Final    Chloride 02/28/2024 106  98 - 107 mmol/L Final    Bicarbonate 02/28/2024 30  21 - 32 mmol/L Final    Anion Gap 02/28/2024 12  10 - 20 mmol/L Final    Urea Nitrogen 02/28/2024 18  6 - 23 mg/dL Final    Creatinine 02/28/2024 0.86  0.50 - 1.05 mg/dL Final    eGFR 02/28/2024 79  >60 mL/min/1.73m*2 Final    Calculations of estimated GFR are performed using the 2021 CKD-EPI Study Refit equation without the race variable for the IDMS-Traceable creatinine methods.  https://jasn.asnjournals.org/content/early/2021/09/22/ASN.1549527247    Calcium 02/28/2024 9.4  8.6 - 10.6 mg/dL Final    Albumin 02/28/2024 4.5  3.4 - 5.0 g/dL Final    Alkaline Phosphatase 02/28/2024 104  33 - 110 U/L Final    Total Protein 02/28/2024 7.2  6.4 - 8.2 g/dL Final    AST 02/28/2024 19  9 - 39 U/L Final    Bilirubin, Total 02/28/2024 0.4  0.0 - 1.2 mg/dL Final    ALT 02/28/2024 25  7 - 45 U/L Final    Patients treated with Sulfasalazine may generate falsely decreased results for ALT.    Thyroid Stimulating Hormone 02/28/2024 3.85  0.44 - 3.98 mIU/L Final    Vitamin D,  25-Hydroxy, Total 02/28/2024 19 (L)  30 - 100 ng/mL Final    Hemoglobin A1C 02/28/2024 6.6 (H)  see below % Final    Estimated Average Glucose 02/28/2024 143  Not Established mg/dL Final    WBC 02/28/2024 7.0  4.4 - 11.3 x10*3/uL Final    nRBC 02/28/2024 0.0  0.0 - 0.0 /100 WBCs Final    RBC 02/28/2024 4.94  4.00 - 5.20 x10*6/uL Final    Hemoglobin 02/28/2024 14.3  12.0 - 16.0 g/dL Final    Hematocrit 02/28/2024 43.1  36.0 - 46.0 % Final    MCV 02/28/2024 87  80 - 100 fL Final    MCH 02/28/2024 28.9  26.0 - 34.0 pg Final    MCHC 02/28/2024 33.2  32.0 - 36.0 g/dL Final    RDW 02/28/2024 14.3  11.5 - 14.5 % Final    Platelets 02/28/2024 329  150 - 450 x10*3/uL Final    Neutrophils % 02/28/2024 44.2  40.0 - 80.0 % Final    Immature Granulocytes %, Automated 02/28/2024 0.6  0.0 - 0.9 % Final    Immature Granulocyte Count (IG) includes promyelocytes, myelocytes and metamyelocytes but does not include bands. Percent differential counts (%) should be interpreted in the context of the absolute cell counts (cells/UL).    Lymphocytes % 02/28/2024 43.5  13.0 - 44.0 % Final    Monocytes % 02/28/2024 6.5  2.0 - 10.0 % Final    Eosinophils % 02/28/2024 4.6  0.0 - 6.0 % Final    Basophils % 02/28/2024 0.6  0.0 - 2.0 % Final    Neutrophils Absolute 02/28/2024 3.08  1.20 - 7.70 x10*3/uL Final    Percent differential counts (%) should be interpreted in the context of the absolute cell counts (cells/uL).    Immature Granulocytes Absolute, Au* 02/28/2024 0.04  0.00 - 0.70 x10*3/uL Final    Lymphocytes Absolute 02/28/2024 3.03  1.20 - 4.80 x10*3/uL Final    Monocytes Absolute 02/28/2024 0.45  0.10 - 1.00 x10*3/uL Final    Eosinophils Absolute 02/28/2024 0.32  0.00 - 0.70 x10*3/uL Final    Basophils Absolute 02/28/2024 0.04  0.00 - 0.10 x10*3/uL Final     Current Outpatient Medications on File Prior to Visit   Medication Sig Dispense Refill    amLODIPine (Norvasc) 5 mg tablet Take 1.5 tablets (7.5 mg) by mouth once daily. 135 tablet 3     Blood glucose monitoring meter kit (FreeStyle Lite Meter) kit Test once daily fasting in the am. 1 each 0    blood sugar diagnostic (Freestyle InsuLinx) strip Test once daily fasting in the am. 100 strip 3    hydroCHLOROthiazide (HYDRODiuril) 25 mg tablet Take 1 tablet (25 mg) by mouth once daily. 30 tablet 11    lancets misc Test once daily fasting in the am. 100 each 3    levothyroxine (Synthroid, Levoxyl) 25 mcg tablet Take 1 tablet (25 mcg) by mouth once daily. 90 tablet 0    metFORMIN XR (Glucophage-XR) 500 mg 24 hr tablet Take 1 tablet (500 mg) by mouth once daily with breakfast. Do not crush, chew, or split. 30 tablet 2    valACYclovir (Valtrex) 1 gram tablet Take by mouth.       No current facility-administered medications on file prior to visit.     No images are attached to the encounter.            Assessment/Plan   Diagnoses and all orders for this visit:  Primary hypertension  Type 2 diabetes mellitus without complication, without long-term current use of insulin (Multi)  Familial hypercholesterolemia  Other fatigue    Patient to continue on hydrochlorothiazide increase dose of amlodipine to 7.5 mg daily  2.   Patient to call if questions or concerns

## 2024-06-05 ENCOUNTER — OFFICE VISIT (OUTPATIENT)
Dept: PRIMARY CARE | Facility: CLINIC | Age: 59
End: 2024-06-05
Payer: COMMERCIAL

## 2024-06-05 VITALS
WEIGHT: 186.4 LBS | SYSTOLIC BLOOD PRESSURE: 128 MMHG | DIASTOLIC BLOOD PRESSURE: 82 MMHG | HEART RATE: 89 BPM | BODY MASS INDEX: 34.65 KG/M2

## 2024-06-05 DIAGNOSIS — R53.83 OTHER FATIGUE: ICD-10-CM

## 2024-06-05 DIAGNOSIS — E11.9 TYPE 2 DIABETES MELLITUS WITHOUT COMPLICATION, WITHOUT LONG-TERM CURRENT USE OF INSULIN (MULTI): ICD-10-CM

## 2024-06-05 DIAGNOSIS — I10 PRIMARY HYPERTENSION: Primary | ICD-10-CM

## 2024-06-05 DIAGNOSIS — E78.01 FAMILIAL HYPERCHOLESTEROLEMIA: ICD-10-CM

## 2024-06-05 PROCEDURE — 99214 OFFICE O/P EST MOD 30 MIN: CPT | Performed by: FAMILY MEDICINE

## 2024-06-05 PROCEDURE — 3050F LDL-C >= 130 MG/DL: CPT | Performed by: FAMILY MEDICINE

## 2024-06-05 PROCEDURE — 1036F TOBACCO NON-USER: CPT | Performed by: FAMILY MEDICINE

## 2024-06-05 PROCEDURE — 3079F DIAST BP 80-89 MM HG: CPT | Performed by: FAMILY MEDICINE

## 2024-06-05 PROCEDURE — 3044F HG A1C LEVEL LT 7.0%: CPT | Performed by: FAMILY MEDICINE

## 2024-06-05 PROCEDURE — 3074F SYST BP LT 130 MM HG: CPT | Performed by: FAMILY MEDICINE

## 2024-08-08 DIAGNOSIS — I10 PRIMARY HYPERTENSION: ICD-10-CM

## 2024-08-08 RX ORDER — HYDROCHLOROTHIAZIDE 25 MG/1
25 TABLET ORAL DAILY
Qty: 30 TABLET | Refills: 11 | Status: SHIPPED | OUTPATIENT
Start: 2024-08-08 | End: 2025-08-08

## 2024-09-30 ENCOUNTER — TELEPHONE (OUTPATIENT)
Dept: PRIMARY CARE | Facility: CLINIC | Age: 59
End: 2024-09-30
Payer: COMMERCIAL

## 2024-09-30 DIAGNOSIS — E03.9 HYPOTHYROIDISM, UNSPECIFIED: ICD-10-CM

## 2024-09-30 DIAGNOSIS — E03.9 HYPOTHYROIDISM, UNSPECIFIED TYPE: ICD-10-CM

## 2024-09-30 NOTE — TELEPHONE ENCOUNTER
Pt called to refill   levothyroxine (Synthroid, Levoxyl) 25 mcg tablet    Take 1 tablet (25 mcg) by mouth once daily.   Quantity: 90 tablet     metFORMIN XR (Glucophage-XR) 500 mg 24 hr tablet   take 1 tablet (500 mg) by mouth once daily with breakfast. Do not crush, chew, or split.   Quantity: 30 tablet     amLODIPine (Norvasc) 5 mg tablet   Take 1.5 tablets (7.5 mg) by mouth once daily.    Quantity: 135 tablet       [facility]  [id number] - [address]  [address] [place] [address]  Phone: [phone]  Fax: [phone]

## 2024-10-01 DIAGNOSIS — E03.9 HYPOTHYROIDISM, UNSPECIFIED TYPE: Primary | ICD-10-CM

## 2024-10-01 RX ORDER — LEVOTHYROXINE SODIUM 25 UG/1
25 TABLET ORAL DAILY
Qty: 90 TABLET | Refills: 0 | OUTPATIENT
Start: 2024-10-01

## 2024-10-01 RX ORDER — LEVOTHYROXINE SODIUM 25 UG/1
25 TABLET ORAL DAILY
Qty: 90 TABLET | Refills: 3 | Status: SHIPPED | OUTPATIENT
Start: 2024-10-01 | End: 2024-10-02 | Stop reason: SDUPTHER

## 2024-10-02 DIAGNOSIS — E11.9 TYPE 2 DIABETES MELLITUS WITHOUT COMPLICATION, WITHOUT LONG-TERM CURRENT USE OF INSULIN (MULTI): ICD-10-CM

## 2024-10-02 DIAGNOSIS — E03.9 HYPOTHYROIDISM, UNSPECIFIED TYPE: ICD-10-CM

## 2024-10-02 DIAGNOSIS — I10 PRIMARY HYPERTENSION: ICD-10-CM

## 2024-10-02 RX ORDER — LEVOTHYROXINE SODIUM 25 UG/1
25 TABLET ORAL DAILY
Qty: 90 TABLET | Refills: 0 | Status: SHIPPED | OUTPATIENT
Start: 2024-10-02

## 2024-10-02 RX ORDER — AMLODIPINE BESYLATE 5 MG/1
7.5 TABLET ORAL DAILY
Qty: 135 TABLET | Refills: 0 | Status: SHIPPED | OUTPATIENT
Start: 2024-10-02

## 2024-10-02 RX ORDER — METFORMIN HYDROCHLORIDE 500 MG/1
500 TABLET, EXTENDED RELEASE ORAL
Qty: 90 TABLET | Refills: 0 | Status: SHIPPED | OUTPATIENT
Start: 2024-10-02 | End: 2025-11-06

## 2024-10-11 ENCOUNTER — TELEPHONE (OUTPATIENT)
Dept: PRIMARY CARE | Facility: CLINIC | Age: 59
End: 2024-10-11
Payer: COMMERCIAL

## 2024-10-11 NOTE — TELEPHONE ENCOUNTER
Patient would like to know if she could start Metformin on Monday, already has the Rx. BP is the same, weight and glucose is the same. Not losing any weight.

## 2024-11-22 ENCOUNTER — APPOINTMENT (OUTPATIENT)
Dept: PRIMARY CARE | Facility: CLINIC | Age: 59
End: 2024-11-22
Payer: COMMERCIAL

## 2025-05-20 ENCOUNTER — APPOINTMENT (OUTPATIENT)
Dept: PRIMARY CARE | Facility: CLINIC | Age: 60
End: 2025-05-20
Payer: COMMERCIAL

## 2025-05-20 VITALS
DIASTOLIC BLOOD PRESSURE: 86 MMHG | BODY MASS INDEX: 39.42 KG/M2 | WEIGHT: 208.8 LBS | HEART RATE: 89 BPM | SYSTOLIC BLOOD PRESSURE: 132 MMHG | HEIGHT: 61 IN

## 2025-05-20 DIAGNOSIS — E11.9 TYPE 2 DIABETES MELLITUS WITHOUT COMPLICATION, WITHOUT LONG-TERM CURRENT USE OF INSULIN: ICD-10-CM

## 2025-05-20 DIAGNOSIS — G47.30 SLEEP APNEA, UNSPECIFIED TYPE: ICD-10-CM

## 2025-05-20 DIAGNOSIS — E03.9 HYPOTHYROIDISM, UNSPECIFIED TYPE: ICD-10-CM

## 2025-05-20 DIAGNOSIS — I10 PRIMARY HYPERTENSION: Primary | ICD-10-CM

## 2025-05-20 PROCEDURE — 1036F TOBACCO NON-USER: CPT | Performed by: FAMILY MEDICINE

## 2025-05-20 PROCEDURE — 99214 OFFICE O/P EST MOD 30 MIN: CPT | Performed by: FAMILY MEDICINE

## 2025-05-20 PROCEDURE — 3008F BODY MASS INDEX DOCD: CPT | Performed by: FAMILY MEDICINE

## 2025-05-20 PROCEDURE — 3079F DIAST BP 80-89 MM HG: CPT | Performed by: FAMILY MEDICINE

## 2025-05-20 PROCEDURE — 3075F SYST BP GE 130 - 139MM HG: CPT | Performed by: FAMILY MEDICINE

## 2025-05-20 RX ORDER — AMLODIPINE BESYLATE 10 MG/1
10 TABLET ORAL DAILY
Qty: 90 TABLET | Refills: 3 | Status: SHIPPED | OUTPATIENT
Start: 2025-05-20

## 2025-05-20 RX ORDER — LEVOTHYROXINE SODIUM 25 UG/1
TABLET ORAL
Qty: 90 TABLET | Refills: 0 | Status: SHIPPED | OUTPATIENT
Start: 2025-05-20

## 2025-05-20 ASSESSMENT — ENCOUNTER SYMPTOMS
OCCASIONAL FEELINGS OF UNSTEADINESS: 0
BACK PAIN: 0
DEPRESSION: 0
APPETITE CHANGE: 0
COUGH: 0
FEVER: 0
LOSS OF SENSATION IN FEET: 0
FACIAL ASYMMETRY: 0
CHEST TIGHTNESS: 0
LIGHT-HEADEDNESS: 0
HEADACHES: 0
COLOR CHANGE: 0
DIZZINESS: 0
PALPITATIONS: 0
ACTIVITY CHANGE: 0
FATIGUE: 0
ARTHRALGIAS: 0
CHOKING: 0

## 2025-05-20 ASSESSMENT — PATIENT HEALTH QUESTIONNAIRE - PHQ9
2. FEELING DOWN, DEPRESSED OR HOPELESS: NOT AT ALL
1. LITTLE INTEREST OR PLEASURE IN DOING THINGS: NOT AT ALL
10. IF YOU CHECKED OFF ANY PROBLEMS, HOW DIFFICULT HAVE THESE PROBLEMS MADE IT FOR YOU TO DO YOUR WORK, TAKE CARE OF THINGS AT HOME, OR GET ALONG WITH OTHER PEOPLE: NOT DIFFICULT AT ALL
SUM OF ALL RESPONSES TO PHQ9 QUESTIONS 1 AND 2: 0

## 2025-05-20 NOTE — PROGRESS NOTES
"Subjective   Patient ID: Jigna Forrest is a 59 y.o. female who presents for Hypertension.    HPI   Patient reports that she is having a lot of stress at home. Her mother just moved to [place] from [place]. Her brother and sister in law also moved but into her house. She has been seeing toña barber cnp who was helping her with hormones.     Patient snores when she sleeps and wakes up with headaches. She does feel tired all the time. She can dream that she is choking or drowning.     Review of Systems   Constitutional:  Negative for activity change, appetite change, fatigue and fever.   HENT:  Negative for congestion.    Respiratory:  Negative for cough, choking and chest tightness.    Cardiovascular:  Negative for chest pain, palpitations and leg swelling.   Musculoskeletal:  Negative for arthralgias, back pain and gait problem.   Skin:  Negative for color change and pallor.   Neurological:  Negative for dizziness, facial asymmetry, light-headedness and headaches.       Objective   /86 (BP Location: Left arm, Patient Position: Sitting)   Pulse 89   Ht 1.556 m (5' 1.25\")   Wt 94.7 kg (208 lb 12.8 oz)   BMI 39.13 kg/m²   BSA Body surface area is 2.02 meters squared.      Physical Exam  Constitutional:       General: She is not in acute distress.     Appearance: Normal appearance. She is not toxic-appearing.   HENT:      Head: Normocephalic.      Right Ear: Tympanic membrane, ear canal and external ear normal.      Left Ear: Tympanic membrane, ear canal and external ear normal.   Eyes:      Conjunctiva/sclera: Conjunctivae normal.      Pupils: Pupils are equal, round, and reactive to light.   Cardiovascular:      Rate and Rhythm: Normal rate and regular rhythm.      Pulses: Normal pulses.      Heart sounds: Normal heart sounds.   Pulmonary:      Effort: No respiratory distress.      Breath sounds: No wheezing, rhonchi or rales.   Musculoskeletal:         General: No swelling or tenderness. "   Skin:     Findings: No lesion or rash.   Neurological:      General: No focal deficit present.      Mental Status: She is alert and oriented to person, place, and time. Mental status is at baseline.      Gait: Gait normal.   Psychiatric:         Mood and Affect: Mood normal.         Behavior: Behavior normal.         Thought Content: Thought content normal.         Judgment: Judgment normal.       No visits with results within 1 Year(s) from this visit.   Latest known visit with results is:   Lab on 02/28/2024   Component Date Value Ref Range Status    Cholesterol 02/28/2024 265 (H)  0 - 199 mg/dL Final          Age      Desirable   Borderline High   High     0-19 Y     0 - 169       170 - 199     >/= 200    20-24 Y     0 - 189       190 - 224     >/= 225         >24 Y     0 - 199       200 - 239     >/= 240   **All ranges are based on fasting samples. Specific   therapeutic targets will vary based on patient-specific   cardiac risk.    Pediatric guidelines reference:Pediatrics 2011, 128(S5).Adult guidelines reference: NCEP ATPIII Guidelines,PAYAL 2001, 258:2486-97    Venipuncture immediately after or during the administration of Metamizole may lead to falsely low results. Testing should be performed immediately prior to Metamizole dosing.    HDL-Cholesterol 02/28/2024 55.0  mg/dL Final      Age       Very Low   Low     Normal    High    0-19 Y    < 35      < 40     40-45     ----  20-24 Y    ----     < 40      >45      ----        >24 Y      ----     < 40     40-60      >60      Cholesterol/HDL Ratio 02/28/2024 4.8   Final      Ref Values  Desirable  < 3.4  High Risk  > 5.0    LDL Calculated 02/28/2024 185 (H)  <=99 mg/dL Final                                Near   Borderline      AGE      Desirable  Optimal    High     High     Very High     0-19 Y     0 - 109     ---    110-129   >/= 130     ----    20-24 Y     0 - 119     ---    120-159   >/= 160     ----      >24 Y     0 -  99   100-129  130-159   160-189      >/=190      VLDL 02/28/2024 25  0 - 40 mg/dL Final    Triglycerides 02/28/2024 127  0 - 149 mg/dL Final       Age         Desirable   Borderline High   High     Very High   0 D-90 D    19 - 174         ----         ----        ----  91 D- 9 Y     0 -  74        75 -  99     >/= 100      ----    10-19 Y     0 -  89        90 - 129     >/= 130      ----    20-24 Y     0 - 114       115 - 149     >/= 150      ----         >24 Y     0 - 149       150 - 199    200- 499    >/= 500    Venipuncture immediately after or during the administration of Metamizole may lead to falsely low results. Testing should be performed immediately prior to Metamizole dosing.    Non HDL Cholesterol 02/28/2024 210 (H)  0 - 149 mg/dL Final          Age       Desirable   Borderline High   High     Very High     0-19 Y     0 - 119       120 - 144     >/= 145    >/= 160    20-24 Y     0 - 149       150 - 189     >/= 190      ----         >24 Y    30 mg/dL above LDL Cholesterol goal      Glucose 02/28/2024 125 (H)  74 - 99 mg/dL Final    Sodium 02/28/2024 143  136 - 145 mmol/L Final    Potassium 02/28/2024 4.6  3.5 - 5.3 mmol/L Final    Chloride 02/28/2024 106  98 - 107 mmol/L Final    Bicarbonate 02/28/2024 30  21 - 32 mmol/L Final    Anion Gap 02/28/2024 12  10 - 20 mmol/L Final    Urea Nitrogen 02/28/2024 18  6 - 23 mg/dL Final    Creatinine 02/28/2024 0.86  0.50 - 1.05 mg/dL Final    eGFR 02/28/2024 79  >60 mL/min/1.73m*2 Final    Calculations of estimated GFR are performed using the 2021 CKD-EPI Study Refit equation without the race variable for the IDMS-Traceable creatinine methods.  https://jasn.asnjournals.org/content/early/2021/09/22/ASN.4645479417    Calcium 02/28/2024 9.4  8.6 - 10.6 mg/dL Final    Albumin 02/28/2024 4.5  3.4 - 5.0 g/dL Final    Alkaline Phosphatase 02/28/2024 104  33 - 110 U/L Final    Total Protein 02/28/2024 7.2  6.4 - 8.2 g/dL Final    AST 02/28/2024 19  9 - 39 U/L Final    Bilirubin, Total 02/28/2024 0.4  0.0 - 1.2  mg/dL Final    ALT 02/28/2024 25  7 - 45 U/L Final    Patients treated with Sulfasalazine may generate falsely decreased results for ALT.    Thyroid Stimulating Hormone 02/28/2024 3.85  0.44 - 3.98 mIU/L Final    Vitamin D, 25-Hydroxy, Total 02/28/2024 19 (L)  30 - 100 ng/mL Final    Hemoglobin A1C 02/28/2024 6.6 (H)  see below % Final    Estimated Average Glucose 02/28/2024 143  Not Established mg/dL Final    WBC 02/28/2024 7.0  4.4 - 11.3 x10*3/uL Final    nRBC 02/28/2024 0.0  0.0 - 0.0 /100 WBCs Final    RBC 02/28/2024 4.94  4.00 - 5.20 x10*6/uL Final    Hemoglobin 02/28/2024 14.3  12.0 - 16.0 g/dL Final    Hematocrit 02/28/2024 43.1  36.0 - 46.0 % Final    MCV 02/28/2024 87  80 - 100 fL Final    MCH 02/28/2024 28.9  26.0 - 34.0 pg Final    MCHC 02/28/2024 33.2  32.0 - 36.0 g/dL Final    RDW 02/28/2024 14.3  11.5 - 14.5 % Final    Platelets 02/28/2024 329  150 - 450 x10*3/uL Final    Neutrophils % 02/28/2024 44.2  40.0 - 80.0 % Final    Immature Granulocytes %, Automated 02/28/2024 0.6  0.0 - 0.9 % Final    Immature Granulocyte Count (IG) includes promyelocytes, myelocytes and metamyelocytes but does not include bands. Percent differential counts (%) should be interpreted in the context of the absolute cell counts (cells/UL).    Lymphocytes % 02/28/2024 43.5  13.0 - 44.0 % Final    Monocytes % 02/28/2024 6.5  2.0 - 10.0 % Final    Eosinophils % 02/28/2024 4.6  0.0 - 6.0 % Final    Basophils % 02/28/2024 0.6  0.0 - 2.0 % Final    Neutrophils Absolute 02/28/2024 3.08  1.20 - 7.70 x10*3/uL Final    Percent differential counts (%) should be interpreted in the context of the absolute cell counts (cells/uL).    Immature Granulocytes Absolute, Au* 02/28/2024 0.04  0.00 - 0.70 x10*3/uL Final    Lymphocytes Absolute 02/28/2024 3.03  1.20 - 4.80 x10*3/uL Final    Monocytes Absolute 02/28/2024 0.45  0.10 - 1.00 x10*3/uL Final    Eosinophils Absolute 02/28/2024 0.32  0.00 - 0.70 x10*3/uL Final    Basophils Absolute  02/28/2024 0.04  0.00 - 0.10 x10*3/uL Final     Medications Ordered Prior to Encounter[1]  No images are attached to the encounter.            Assessment/Plan   Diagnoses and all orders for this visit:  Primary hypertension  -     amLODIPine (Norvasc) 10 mg tablet; Take 1 tablet (10 mg) by mouth once daily.  Type 2 diabetes mellitus without complication, without long-term current use of insulin  Hypothyroidism, unspecified type  -     levothyroxine (Synthroid, Levoxyl) 25 mcg tablet; Take one pill daily and two pills on Sunday  -     Tsh With Reflex To Free T4 If Abnormal; Future  Sleep apnea, unspecified type  -     Home sleep apnea test (HSAT); Future    Patient to change dose of thyroid and recheck tsh in 8 weeks  Patient to increase her dose of amlodipine  Patient to call if questions or concerns            [1]   Current Outpatient Medications on File Prior to Visit   Medication Sig Dispense Refill    Blood glucose monitoring meter kit (FreeStyle Lite Meter) kit Test once daily fasting in the am. 1 each 0    blood sugar diagnostic (Freestyle [employer]x) strip Test once daily fasting in the am. 100 strip 3    hydroCHLOROthiazide (HYDRODiuril) 25 mg tablet Take 1 tablet (25 mg) by mouth once daily. 30 tablet 11    lancets misc Test once daily fasting in the am. 100 each 3    valACYclovir (Valtrex) 1 gram tablet Take by mouth.      [DISCONTINUED] amLODIPine (Norvasc) 5 mg tablet Take 1.5 tablets (7.5 mg) by mouth once daily. 135 tablet 0    [DISCONTINUED] levothyroxine (Synthroid, Levoxyl) 25 mcg tablet Take 1 tablet (25 mcg) by mouth once daily. 90 tablet 0    metFORMIN XR (Glucophage-XR) 500 mg 24 hr tablet Take 1 tablet (500 mg) by mouth once daily with breakfast. Do not crush, chew, or split. (Patient not taking: Reported on 5/20/2025) 90 tablet 0     No current facility-administered medications on file prior to visit.

## 2025-06-24 ENCOUNTER — TELEPHONE (OUTPATIENT)
Dept: PRIMARY CARE | Facility: CLINIC | Age: 60
End: 2025-06-24
Payer: COMMERCIAL

## 2025-06-30 ENCOUNTER — APPOINTMENT (OUTPATIENT)
Dept: PRIMARY CARE | Facility: CLINIC | Age: 60
End: 2025-06-30
Payer: COMMERCIAL

## 2025-06-30 VITALS
DIASTOLIC BLOOD PRESSURE: 86 MMHG | HEART RATE: 100 BPM | SYSTOLIC BLOOD PRESSURE: 130 MMHG | BODY MASS INDEX: 39.43 KG/M2 | OXYGEN SATURATION: 100 % | WEIGHT: 210.4 LBS

## 2025-06-30 DIAGNOSIS — I10 PRIMARY HYPERTENSION: Primary | ICD-10-CM

## 2025-06-30 DIAGNOSIS — E11.9 TYPE 2 DIABETES MELLITUS WITHOUT COMPLICATION, WITHOUT LONG-TERM CURRENT USE OF INSULIN: ICD-10-CM

## 2025-06-30 DIAGNOSIS — E03.9 HYPOTHYROIDISM, UNSPECIFIED TYPE: ICD-10-CM

## 2025-06-30 PROCEDURE — 99214 OFFICE O/P EST MOD 30 MIN: CPT | Performed by: FAMILY MEDICINE

## 2025-06-30 PROCEDURE — 1036F TOBACCO NON-USER: CPT | Performed by: FAMILY MEDICINE

## 2025-06-30 PROCEDURE — 3079F DIAST BP 80-89 MM HG: CPT | Performed by: FAMILY MEDICINE

## 2025-06-30 PROCEDURE — 3075F SYST BP GE 130 - 139MM HG: CPT | Performed by: FAMILY MEDICINE

## 2025-06-30 RX ORDER — METFORMIN HYDROCHLORIDE 500 MG/1
500 TABLET, EXTENDED RELEASE ORAL
Qty: 100 TABLET | Refills: 3 | Status: SHIPPED | OUTPATIENT
Start: 2025-06-30 | End: 2026-08-04

## 2025-06-30 RX ORDER — AMLODIPINE BESYLATE 2.5 MG/1
2.5 TABLET ORAL DAILY
Qty: 30 TABLET | Refills: 5 | Status: SHIPPED | OUTPATIENT
Start: 2025-06-30 | End: 2025-12-27

## 2025-06-30 RX ORDER — AMLODIPINE BESYLATE 5 MG/1
5 TABLET ORAL DAILY
Qty: 30 TABLET | Refills: 11 | Status: SHIPPED | OUTPATIENT
Start: 2025-06-30

## 2025-06-30 ASSESSMENT — ENCOUNTER SYMPTOMS
COLOR CHANGE: 0
ACTIVITY CHANGE: 0
HEADACHES: 1
DIZZINESS: 1
BACK PAIN: 0
ARTHRALGIAS: 0
OCCASIONAL FEELINGS OF UNSTEADINESS: 0
FATIGUE: 0
COUGH: 0
LOSS OF SENSATION IN FEET: 0
FACIAL ASYMMETRY: 0
APPETITE CHANGE: 0
LIGHT-HEADEDNESS: 1
WEAKNESS: 0
CHEST TIGHTNESS: 0
CHOKING: 0
FEVER: 0
DEPRESSION: 0
PALPITATIONS: 0

## 2025-06-30 ASSESSMENT — PATIENT HEALTH QUESTIONNAIRE - PHQ9
SUM OF ALL RESPONSES TO PHQ9 QUESTIONS 1 AND 2: 0
1. LITTLE INTEREST OR PLEASURE IN DOING THINGS: NOT AT ALL
2. FEELING DOWN, DEPRESSED OR HOPELESS: NOT AT ALL

## 2025-06-30 NOTE — PROGRESS NOTES
Subjective   Patient ID: Jigna Forrest is a 59 y.o. female who presents for Hypertension (Amlodipine is making her dizzy since the dose was increased. ).    HPI   Patient reports that she is getting dizzy spells from the higher dose of her amlodipine.  Denies any fever chills denies any nausea vomiting or constipation.    Review of Systems   Constitutional:  Negative for activity change, appetite change, fatigue and fever.   HENT:  Negative for congestion.    Respiratory:  Negative for cough, choking and chest tightness.    Cardiovascular:  Negative for chest pain, palpitations and leg swelling.   Musculoskeletal:  Negative for arthralgias, back pain and gait problem.   Skin:  Negative for color change and pallor.   Neurological:  Positive for dizziness, light-headedness and headaches. Negative for facial asymmetry and weakness.       Objective   /86 (BP Location: Left arm, Patient Position: Sitting)   Pulse 100   Wt 95.4 kg (210 lb 6.4 oz)   SpO2 100%   BMI 39.43 kg/m²   BSA Body surface area is 2.03 meters squared.      Physical Exam  Constitutional:       General: She is not in acute distress.     Appearance: Normal appearance. She is not toxic-appearing.   HENT:      Head: Normocephalic.      Right Ear: Tympanic membrane, ear canal and external ear normal.      Left Ear: Tympanic membrane, ear canal and external ear normal.   Eyes:      Conjunctiva/sclera: Conjunctivae normal.      Pupils: Pupils are equal, round, and reactive to light.   Cardiovascular:      Rate and Rhythm: Normal rate and regular rhythm.      Pulses: Normal pulses.      Heart sounds: Normal heart sounds.   Pulmonary:      Effort: No respiratory distress.      Breath sounds: No wheezing, rhonchi or rales.   Musculoskeletal:         General: No swelling or tenderness.   Skin:     Findings: No lesion or rash.   Neurological:      General: No focal deficit present.      Mental Status: She is alert and oriented to person, place, and  time. Mental status is at baseline.      Gait: Gait normal.   Psychiatric:         Mood and Affect: Mood normal.         Behavior: Behavior normal.         Thought Content: Thought content normal.         Judgment: Judgment normal.       No visits with results within 1 Year(s) from this visit.   Latest known visit with results is:   Lab on 02/28/2024   Component Date Value Ref Range Status    Cholesterol 02/28/2024 265 (H)  0 - 199 mg/dL Final          Age      Desirable   Borderline High   High     0-19 Y     0 - 169       170 - 199     >/= 200    20-24 Y     0 - 189       190 - 224     >/= 225         >24 Y     0 - 199       200 - 239     >/= 240   **All ranges are based on fasting samples. Specific   therapeutic targets will vary based on patient-specific   cardiac risk.    Pediatric guidelines reference:Pediatrics 2011, 128(S5).Adult guidelines reference: NCEP ATPIII Guidelines,PAYAL 2001, 258:2486-97    Venipuncture immediately after or during the administration of Metamizole may lead to falsely low results. Testing should be performed immediately prior to Metamizole dosing.    HDL-Cholesterol 02/28/2024 55.0  mg/dL Final      Age       Very Low   Low     Normal    High    0-19 Y    < 35      < 40     40-45     ----  20-24 Y    ----     < 40      >45      ----        >24 Y      ----     < 40     40-60      >60      Cholesterol/HDL Ratio 02/28/2024 4.8   Final      Ref Values  Desirable  < 3.4  High Risk  > 5.0    LDL Calculated 02/28/2024 185 (H)  <=99 mg/dL Final                                Near   Borderline      AGE      Desirable  Optimal    High     High     Very High     0-19 Y     0 - 109     ---    110-129   >/= 130     ----    20-24 Y     0 - 119     ---    120-159   >/= 160     ----      >24 Y     0 -  99   100-129  130-159   160-189     >/=190      VLDL 02/28/2024 25  0 - 40 mg/dL Final    Triglycerides 02/28/2024 127  0 - 149 mg/dL Final       Age         Desirable   Borderline High   High     Very  High   0 D-90 D    19 - 174         ----         ----        ----  91 D- 9 Y     0 -  74        75 -  99     >/= 100      ----    10-19 Y     0 -  89        90 - 129     >/= 130      ----    20-24 Y     0 - 114       115 - 149     >/= 150      ----         >24 Y     0 - 149       150 - 199    200- 499    >/= 500    Venipuncture immediately after or during the administration of Metamizole may lead to falsely low results. Testing should be performed immediately prior to Metamizole dosing.    Non HDL Cholesterol 02/28/2024 210 (H)  0 - 149 mg/dL Final          Age       Desirable   Borderline High   High     Very High     0-19 Y     0 - 119       120 - 144     >/= 145    >/= 160    20-24 Y     0 - 149       150 - 189     >/= 190      ----         >24 Y    30 mg/dL above LDL Cholesterol goal      Glucose 02/28/2024 125 (H)  74 - 99 mg/dL Final    Sodium 02/28/2024 143  136 - 145 mmol/L Final    Potassium 02/28/2024 4.6  3.5 - 5.3 mmol/L Final    Chloride 02/28/2024 106  98 - 107 mmol/L Final    Bicarbonate 02/28/2024 30  21 - 32 mmol/L Final    Anion Gap 02/28/2024 12  10 - 20 mmol/L Final    Urea Nitrogen 02/28/2024 18  6 - 23 mg/dL Final    Creatinine 02/28/2024 0.86  0.50 - 1.05 mg/dL Final    eGFR 02/28/2024 79  >60 mL/min/1.73m*2 Final    Calculations of estimated GFR are performed using the 2021 CKD-EPI Study Refit equation without the race variable for the IDMS-Traceable creatinine methods.  https://jasn.asnjournals.org/content/early/2021/09/22/ASN.4028996047    Calcium 02/28/2024 9.4  8.6 - 10.6 mg/dL Final    Albumin 02/28/2024 4.5  3.4 - 5.0 g/dL Final    Alkaline Phosphatase 02/28/2024 104  33 - 110 U/L Final    Total Protein 02/28/2024 7.2  6.4 - 8.2 g/dL Final    AST 02/28/2024 19  9 - 39 U/L Final    Bilirubin, Total 02/28/2024 0.4  0.0 - 1.2 mg/dL Final    ALT 02/28/2024 25  7 - 45 U/L Final    Patients treated with Sulfasalazine may generate falsely decreased results for ALT.    Thyroid Stimulating  Hormone 02/28/2024 3.85  0.44 - 3.98 mIU/L Final    Vitamin D, 25-Hydroxy, Total 02/28/2024 19 (L)  30 - 100 ng/mL Final    Hemoglobin A1C 02/28/2024 6.6 (H)  see below % Final    Estimated Average Glucose 02/28/2024 143  Not Established mg/dL Final    WBC 02/28/2024 7.0  4.4 - 11.3 x10*3/uL Final    nRBC 02/28/2024 0.0  0.0 - 0.0 /100 WBCs Final    RBC 02/28/2024 4.94  4.00 - 5.20 x10*6/uL Final    Hemoglobin 02/28/2024 14.3  12.0 - 16.0 g/dL Final    Hematocrit 02/28/2024 43.1  36.0 - 46.0 % Final    MCV 02/28/2024 87  80 - 100 fL Final    MCH 02/28/2024 28.9  26.0 - 34.0 pg Final    MCHC 02/28/2024 33.2  32.0 - 36.0 g/dL Final    RDW 02/28/2024 14.3  11.5 - 14.5 % Final    Platelets 02/28/2024 329  150 - 450 x10*3/uL Final    Neutrophils % 02/28/2024 44.2  40.0 - 80.0 % Final    Immature Granulocytes %, Automated 02/28/2024 0.6  0.0 - 0.9 % Final    Immature Granulocyte Count (IG) includes promyelocytes, myelocytes and metamyelocytes but does not include bands. Percent differential counts (%) should be interpreted in the context of the absolute cell counts (cells/UL).    Lymphocytes % 02/28/2024 43.5  13.0 - 44.0 % Final    Monocytes % 02/28/2024 6.5  2.0 - 10.0 % Final    Eosinophils % 02/28/2024 4.6  0.0 - 6.0 % Final    Basophils % 02/28/2024 0.6  0.0 - 2.0 % Final    Neutrophils Absolute 02/28/2024 3.08  1.20 - 7.70 x10*3/uL Final    Percent differential counts (%) should be interpreted in the context of the absolute cell counts (cells/uL).    Immature Granulocytes Absolute, Au* 02/28/2024 0.04  0.00 - 0.70 x10*3/uL Final    Lymphocytes Absolute 02/28/2024 3.03  1.20 - 4.80 x10*3/uL Final    Monocytes Absolute 02/28/2024 0.45  0.10 - 1.00 x10*3/uL Final    Eosinophils Absolute 02/28/2024 0.32  0.00 - 0.70 x10*3/uL Final    Basophils Absolute 02/28/2024 0.04  0.00 - 0.10 x10*3/uL Final     Medications Ordered Prior to Encounter[1]  No images are attached to the encounter.            Assessment/Plan   Diagnoses  and all orders for this visit:  Primary hypertension  -     amLODIPine (Norvasc) 5 mg tablet; Take 1 tablet (5 mg) by mouth once daily. This is in addition to 2.5 mg she will also be taking once daily to be completely 7.5 mg daily dose  -     amLODIPine (Norvasc) 2.5 mg tablet; Take 1 tablet (2.5 mg) by mouth once daily. This is in addition to 5 mg she will also be taking once daily to be completely 7.5 mg daily dose  Type 2 diabetes mellitus without complication, without long-term current use of insulin  -     metFORMIN XR (Glucophage-XR) 500 mg 24 hr tablet; Take 1 tablet (500 mg) by mouth once daily with breakfast. Do not crush, chew, or split.  Hypothyroidism, unspecified type    Patient to have home sleep test  Patient to decrease dose of amlodipine  Patient to call if questions or concerns            [1]   Current Outpatient Medications on File Prior to Visit   Medication Sig Dispense Refill    Blood glucose monitoring meter kit (FreeStyle Lite Meter) kit Test once daily fasting in the am. 1 each 0    blood sugar diagnostic (Freestyle [employer]) strip Test once daily fasting in the am. 100 strip 3    hydroCHLOROthiazide (HYDRODiuril) 25 mg tablet Take 1 tablet (25 mg) by mouth once daily. 30 tablet 11    lancets misc Test once daily fasting in the am. 100 each 3    levothyroxine (Synthroid, Levoxyl) 25 mcg tablet Take one pill daily and two pills on Sunday 90 tablet 0    valACYclovir (Valtrex) 1 gram tablet Take by mouth.      [DISCONTINUED] amLODIPine (Norvasc) 10 mg tablet Take 1 tablet (10 mg) by mouth once daily. 90 tablet 3    metFORMIN XR (Glucophage-XR) 500 mg 24 hr tablet Take 1 tablet (500 mg) by mouth once daily with breakfast. Do not crush, chew, or split. (Patient not taking: Reported on 6/30/2025) 90 tablet 0     No current facility-administered medications on file prior to visit.

## 2025-07-28 ENCOUNTER — TELEPHONE (OUTPATIENT)
Dept: PRIMARY CARE | Facility: CLINIC | Age: 60
End: 2025-07-28
Payer: COMMERCIAL

## 2025-07-28 DIAGNOSIS — E11.9 TYPE 2 DIABETES MELLITUS WITHOUT COMPLICATION, WITHOUT LONG-TERM CURRENT USE OF INSULIN: ICD-10-CM

## 2025-07-28 RX ORDER — METFORMIN HYDROCHLORIDE 500 MG/1
500 TABLET, EXTENDED RELEASE ORAL
Qty: 180 TABLET | Refills: 0 | Status: SHIPPED | OUTPATIENT
Start: 2025-07-28

## 2025-07-28 NOTE — TELEPHONE ENCOUNTER
Patient left  voicemail requesting to increase Metformin 500 mg, to 1,000 mg daily. If okay will need a refill.

## 2025-08-19 ENCOUNTER — PATIENT OUTREACH (OUTPATIENT)
Dept: CARE COORDINATION | Facility: CLINIC | Age: 60
End: 2025-08-19
Payer: COMMERCIAL

## 2025-08-19 DIAGNOSIS — Z12.31 ENCOUNTER FOR SCREENING MAMMOGRAM FOR BREAST CANCER: ICD-10-CM

## 2025-08-26 DIAGNOSIS — I10 PRIMARY HYPERTENSION: ICD-10-CM

## 2025-08-26 RX ORDER — HYDROCHLOROTHIAZIDE 25 MG/1
25 TABLET ORAL DAILY
Qty: 30 TABLET | Refills: 11 | Status: SHIPPED | OUTPATIENT
Start: 2025-08-26

## 2025-08-27 DIAGNOSIS — E03.9 HYPOTHYROIDISM, UNSPECIFIED TYPE: ICD-10-CM

## 2025-08-28 RX ORDER — LEVOTHYROXINE SODIUM 25 UG/1
TABLET ORAL
Qty: 90 TABLET | Refills: 0 | Status: SHIPPED | OUTPATIENT
Start: 2025-08-28

## 2025-09-05 DIAGNOSIS — E11.9 TYPE 2 DIABETES MELLITUS WITHOUT COMPLICATION, WITHOUT LONG-TERM CURRENT USE OF INSULIN: ICD-10-CM
